# Patient Record
Sex: FEMALE | Race: WHITE | HISPANIC OR LATINO
[De-identification: names, ages, dates, MRNs, and addresses within clinical notes are randomized per-mention and may not be internally consistent; named-entity substitution may affect disease eponyms.]

---

## 2017-04-03 ENCOUNTER — APPOINTMENT (OUTPATIENT)
Dept: OBGYN | Facility: CLINIC | Age: 32
End: 2017-04-03

## 2017-09-05 ENCOUNTER — APPOINTMENT (OUTPATIENT)
Dept: OBGYN | Facility: CLINIC | Age: 32
End: 2017-09-05
Payer: MEDICAID

## 2017-09-05 VITALS
BODY MASS INDEX: 31.32 KG/M2 | WEIGHT: 188 LBS | SYSTOLIC BLOOD PRESSURE: 120 MMHG | DIASTOLIC BLOOD PRESSURE: 80 MMHG | HEIGHT: 65 IN

## 2017-09-05 DIAGNOSIS — Z83.3 FAMILY HISTORY OF DIABETES MELLITUS: ICD-10-CM

## 2017-09-05 DIAGNOSIS — Z82.49 FAMILY HISTORY OF ISCHEMIC HEART DISEASE AND OTHER DISEASES OF THE CIRCULATORY SYSTEM: ICD-10-CM

## 2017-09-05 PROCEDURE — 99203 OFFICE O/P NEW LOW 30 MIN: CPT

## 2017-09-20 ENCOUNTER — APPOINTMENT (OUTPATIENT)
Dept: OBGYN | Facility: CLINIC | Age: 32
End: 2017-09-20

## 2017-10-25 ENCOUNTER — APPOINTMENT (OUTPATIENT)
Dept: OBGYN | Facility: CLINIC | Age: 32
End: 2017-10-25
Payer: MEDICAID

## 2017-10-25 PROCEDURE — 51784 ANAL/URINARY MUSCLE STUDY: CPT

## 2017-10-25 PROCEDURE — 51741 ELECTRO-UROFLOWMETRY FIRST: CPT

## 2017-10-25 PROCEDURE — 51729 CYSTOMETROGRAM W/VP&UP: CPT

## 2017-11-09 ENCOUNTER — APPOINTMENT (OUTPATIENT)
Dept: OBGYN | Facility: CLINIC | Age: 32
End: 2017-11-09

## 2017-11-09 VITALS
SYSTOLIC BLOOD PRESSURE: 120 MMHG | BODY MASS INDEX: 30.82 KG/M2 | HEIGHT: 65 IN | WEIGHT: 185 LBS | DIASTOLIC BLOOD PRESSURE: 80 MMHG

## 2017-11-20 ENCOUNTER — APPOINTMENT (OUTPATIENT)
Dept: OBGYN | Facility: CLINIC | Age: 32
End: 2017-11-20
Payer: MEDICAID

## 2017-11-20 VITALS
SYSTOLIC BLOOD PRESSURE: 120 MMHG | WEIGHT: 185 LBS | BODY MASS INDEX: 30.82 KG/M2 | HEIGHT: 65 IN | DIASTOLIC BLOOD PRESSURE: 80 MMHG

## 2017-11-20 PROCEDURE — 99213 OFFICE O/P EST LOW 20 MIN: CPT

## 2017-12-27 ENCOUNTER — APPOINTMENT (OUTPATIENT)
Dept: OBGYN | Facility: CLINIC | Age: 32
End: 2017-12-27
Payer: MEDICAID

## 2017-12-27 VITALS
BODY MASS INDEX: 29.66 KG/M2 | SYSTOLIC BLOOD PRESSURE: 120 MMHG | WEIGHT: 178 LBS | DIASTOLIC BLOOD PRESSURE: 80 MMHG | HEIGHT: 65 IN

## 2017-12-27 DIAGNOSIS — N76.0 ACUTE VAGINITIS: ICD-10-CM

## 2017-12-27 PROCEDURE — 99213 OFFICE O/P EST LOW 20 MIN: CPT

## 2018-04-10 ENCOUNTER — APPOINTMENT (OUTPATIENT)
Dept: OBGYN | Facility: CLINIC | Age: 33
End: 2018-04-10
Payer: MEDICAID

## 2018-04-10 VITALS — DIASTOLIC BLOOD PRESSURE: 70 MMHG | SYSTOLIC BLOOD PRESSURE: 125 MMHG | BODY MASS INDEX: 29.62 KG/M2 | WEIGHT: 178 LBS

## 2018-04-10 PROCEDURE — 99213 OFFICE O/P EST LOW 20 MIN: CPT

## 2018-05-16 ENCOUNTER — APPOINTMENT (OUTPATIENT)
Dept: ENDOCRINOLOGY | Facility: CLINIC | Age: 33
End: 2018-05-16

## 2018-06-07 ENCOUNTER — EMERGENCY (EMERGENCY)
Facility: HOSPITAL | Age: 33
LOS: 1 days | Discharge: HOME | End: 2018-06-07
Attending: EMERGENCY MEDICINE
Payer: SELF-PAY

## 2018-06-07 VITALS — RESPIRATION RATE: 19 BRPM | HEART RATE: 98 BPM | SYSTOLIC BLOOD PRESSURE: 135 MMHG | DIASTOLIC BLOOD PRESSURE: 78 MMHG

## 2018-06-07 VITALS
RESPIRATION RATE: 18 BRPM | DIASTOLIC BLOOD PRESSURE: 72 MMHG | SYSTOLIC BLOOD PRESSURE: 119 MMHG | HEART RATE: 91 BPM | OXYGEN SATURATION: 99 %

## 2018-06-07 DIAGNOSIS — F41.0 PANIC DISORDER [EPISODIC PAROXYSMAL ANXIETY]: ICD-10-CM

## 2018-06-07 LAB
ALBUMIN SERPL ELPH-MCNC: 4.7 G/DL — SIGNIFICANT CHANGE UP (ref 3.3–5)
ALP SERPL-CCNC: 81 U/L — SIGNIFICANT CHANGE UP (ref 40–120)
ALT FLD-CCNC: 11 U/L — SIGNIFICANT CHANGE UP (ref 10–45)
ANION GAP SERPL CALC-SCNC: 13 MMOL/L — SIGNIFICANT CHANGE UP (ref 5–17)
APAP SERPL-MCNC: <15 UG/ML — SIGNIFICANT CHANGE UP (ref 10–30)
AST SERPL-CCNC: 11 U/L — SIGNIFICANT CHANGE UP (ref 10–40)
BASOPHILS # BLD AUTO: 0 K/UL — SIGNIFICANT CHANGE UP (ref 0–0.2)
BASOPHILS NFR BLD AUTO: 0.2 % — SIGNIFICANT CHANGE UP (ref 0–2)
BILIRUB SERPL-MCNC: 0.3 MG/DL — SIGNIFICANT CHANGE UP (ref 0.2–1.2)
BUN SERPL-MCNC: 8 MG/DL — SIGNIFICANT CHANGE UP (ref 7–23)
CALCIUM SERPL-MCNC: 9.7 MG/DL — SIGNIFICANT CHANGE UP (ref 8.4–10.5)
CHLORIDE SERPL-SCNC: 100 MMOL/L — SIGNIFICANT CHANGE UP (ref 96–108)
CO2 SERPL-SCNC: 24 MMOL/L — SIGNIFICANT CHANGE UP (ref 22–31)
CREAT SERPL-MCNC: 0.69 MG/DL — SIGNIFICANT CHANGE UP (ref 0.5–1.3)
EOSINOPHIL # BLD AUTO: 0 K/UL — SIGNIFICANT CHANGE UP (ref 0–0.5)
EOSINOPHIL NFR BLD AUTO: 0.4 % — SIGNIFICANT CHANGE UP (ref 0–6)
ETHANOL SERPL-MCNC: SIGNIFICANT CHANGE UP MG/DL (ref 0–10)
GLUCOSE SERPL-MCNC: 99 MG/DL — SIGNIFICANT CHANGE UP (ref 70–99)
HCG SERPL-ACNC: <2 MIU/ML — LOW (ref 5–24)
HCT VFR BLD CALC: 37.3 % — SIGNIFICANT CHANGE UP (ref 34.5–45)
HGB BLD-MCNC: 12.4 G/DL — SIGNIFICANT CHANGE UP (ref 11.5–15.5)
LYMPHOCYTES # BLD AUTO: 1.8 K/UL — SIGNIFICANT CHANGE UP (ref 1–3.3)
LYMPHOCYTES # BLD AUTO: 16 % — SIGNIFICANT CHANGE UP (ref 13–44)
MCHC RBC-ENTMCNC: 26 PG — LOW (ref 27–34)
MCHC RBC-ENTMCNC: 33.1 GM/DL — SIGNIFICANT CHANGE UP (ref 32–36)
MCV RBC AUTO: 78.5 FL — LOW (ref 80–100)
MONOCYTES # BLD AUTO: 0.6 K/UL — SIGNIFICANT CHANGE UP (ref 0–0.9)
MONOCYTES NFR BLD AUTO: 5.7 % — SIGNIFICANT CHANGE UP (ref 2–14)
NEUTROPHILS # BLD AUTO: 8.5 K/UL — HIGH (ref 1.8–7.4)
NEUTROPHILS NFR BLD AUTO: 77.7 % — HIGH (ref 43–77)
PLATELET # BLD AUTO: 387 K/UL — SIGNIFICANT CHANGE UP (ref 150–400)
POTASSIUM SERPL-MCNC: 3.7 MMOL/L — SIGNIFICANT CHANGE UP (ref 3.5–5.3)
POTASSIUM SERPL-SCNC: 3.7 MMOL/L — SIGNIFICANT CHANGE UP (ref 3.5–5.3)
PROT SERPL-MCNC: 8.9 G/DL — HIGH (ref 6–8.3)
RBC # BLD: 4.75 M/UL — SIGNIFICANT CHANGE UP (ref 3.8–5.2)
RBC # FLD: 14 % — SIGNIFICANT CHANGE UP (ref 10.3–14.5)
SALICYLATES SERPL-MCNC: <2 MG/DL — LOW (ref 15–30)
SODIUM SERPL-SCNC: 137 MMOL/L — SIGNIFICANT CHANGE UP (ref 135–145)
TROPONIN T SERPL-MCNC: <0.01 NG/ML — SIGNIFICANT CHANGE UP (ref 0–0.06)
WBC # BLD: 10.9 K/UL — HIGH (ref 3.8–10.5)
WBC # FLD AUTO: 10.9 K/UL — HIGH (ref 3.8–10.5)

## 2018-06-07 PROCEDURE — 84702 CHORIONIC GONADOTROPIN TEST: CPT

## 2018-06-07 PROCEDURE — 84484 ASSAY OF TROPONIN QUANT: CPT

## 2018-06-07 PROCEDURE — 80053 COMPREHEN METABOLIC PANEL: CPT

## 2018-06-07 PROCEDURE — 90792 PSYCH DIAG EVAL W/MED SRVCS: CPT

## 2018-06-07 PROCEDURE — 99284 EMERGENCY DEPT VISIT MOD MDM: CPT | Mod: 25

## 2018-06-07 PROCEDURE — 93005 ELECTROCARDIOGRAM TRACING: CPT

## 2018-06-07 PROCEDURE — 85027 COMPLETE CBC AUTOMATED: CPT

## 2018-06-07 PROCEDURE — 99285 EMERGENCY DEPT VISIT HI MDM: CPT

## 2018-06-07 PROCEDURE — 93010 ELECTROCARDIOGRAM REPORT: CPT

## 2018-06-07 PROCEDURE — 80307 DRUG TEST PRSMV CHEM ANLYZR: CPT

## 2018-06-07 RX ADMIN — Medication 1 MILLIGRAM(S): at 10:57
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
## 2018-06-07 NOTE — ED PROVIDER NOTE - CARE PLAN
Principal Discharge DX:	Syncope, unspecified syncope type  Assessment and plan of treatment:	1) Drink plenty of fluids   2) You were given a copy of your results, please show them to your doctor for review.   3) Please follow up with your primary medical doctor in 1-2 days for reevaluation. If you do not have pmd please call the general medicine clinic for an appointment at [phone].   4) Also, follow up with cardiology in 2-3 days for rapid follow up, call [phone] for appointment and give form at your appointment   5) Also, follow up with your psychiatrist in 2-3 days, if you cannot make an appointment follow up at clinic at [phone] for an appointment   6) Return to the ED for feeling faint again, any pain, chest pain, shortness of breath, dizziness, lightheadedness, pass out again, fever greater than 100.4, nausea, vomiting, depression, anxiety, thoughts of suicide, thoughts of harming others, or if you have any other new, worsening, or concerning symptoms.  Secondary Diagnosis:	Panic attack Principal Discharge DX:	Syncope, unspecified syncope type  Assessment and plan of treatment:	1) Drink plenty of fluids   2) You were given a copy of your results, please show them to your doctor for review.   3) Please follow up with your primary medical doctor in 1-2 days for reevaluation. If you do not have pmd please call the general medicine clinic for an appointment at [phone].   4) Also, follow up with cardiology in 2-3 days for rapid follow up, call [phone] for appointment and give form at your appointment   5) Also, follow up with your psychiatrist in 2-3 days, if you cannot make an appointment follow up at clinic at [phone] for an appointment.   6) Return to the ED for feeling faint again, any pain, chest pain, shortness of breath, dizziness, lightheadedness, pass out again, fever greater than 100.4, nausea, vomiting, depression, anxiety, thoughts of suicide, thoughts of harming others, or if you have any other new, worsening, or concerning symptoms.  Secondary Diagnosis:	Panic attack
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
## 2018-06-07 NOTE — ED BEHAVIORAL HEALTH ASSESSMENT NOTE - OTHER PAST PSYCHIATRIC HISTORY (INCLUDE DETAILS REGARDING ONSET, COURSE OF ILLNESS, INPATIENT/OUTPATIENT TREATMENT)
Sees a therapist at [facility] [place], reports seeing a psychiatrist once at [facility] last month- was not started on medications

## 2018-06-07 NOTE — ED PROVIDER NOTE - ATTENDING CONTRIBUTION TO CARE
attending Forrest: 32yF presents after episode of anxiety. Assoc with substernal chest pain, flushing, palpitations, shortness of breath. Also with episode of syncope. No trauma. Asymptomatic in ED. Non-smoker. No drug use. No FH of early cardiac disease. Has seen a psychiatrist outpatient but not on any meds. Likely panic attack but will eval syncope and episode of chest pain. HEART score 0. Denies SI or HI. Will obtain labs, ekg, place on tele, psych eval and reassess.

## 2018-06-07 NOTE — ED PROVIDER NOTE - PROGRESS NOTE DETAILS
Patient given ativan 1mg, patient now calm, she feels her normal self, no anxiety. MD Luís: Seen by psych, cleared by them, patient to f/u with outpatient psychiatrist, no need to start medication at this time. MD Luís: patient feels improved, no chest pain, shortness of breath, lightheadedness, dizziness, nausea, vomiting. Asymptomatic, will d/c home with rapid cardiology follow up form given to patient, psych follow up given as well in case patient cannot see own psychiatrist, reviewed labwork, strict return precautions given.
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
## 2018-06-07 NOTE — ED PROVIDER NOTE - MEDICAL DECISION MAKING DETAILS
32 year old female, presents to the ED for anxiety. Patient was at work when she reports that she had a sensation that something was wrong with her kids. Then had syncope preceded by chest pain, shortness of breath, palpitations, feeling flush. Likely panic attack but will need to eval for syncope and ACS, low risk given heart score of 0, so will delta trop and have patient follow up with cardiology. For concern for her panic attacks will obtain psych clearance labs and psych consult.

## 2018-06-07 NOTE — ED BEHAVIORAL HEALTH ASSESSMENT NOTE - SAFETY PLAN DETAILS
Patient sees outpatient provider at [facility] in [place] on a weekly basis
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
## 2018-06-07 NOTE — ED ADULT NURSE NOTE - CHPI ED SYMPTOMS NEG
no change in level of consciousness/no confusion/no disorientation/no agitation/no suicidal/no homicidal/no paranoia/no hallucinations

## 2018-06-07 NOTE — ED BEHAVIORAL HEALTH ASSESSMENT NOTE - DESCRIPTION
employed as a  at [employer], has 3 dependents in the custody of mother (APS involved), no substance abuse, domiciled in an apartment in [place] None Patient anxious, received Ativan 1mg po x 1 dose, with good effect, calm, sedated however arousable following taking medications.
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
## 2018-06-07 NOTE — ED PROVIDER NOTE - PLAN OF CARE
1) Drink plenty of fluids   2) You were given a copy of your results, please show them to your doctor for review.   3) Please follow up with your primary medical doctor in 1-2 days for reevaluation. If you do not have pmd please call the general medicine clinic for an appointment at [phone].   4) Also, follow up with cardiology in 2-3 days for rapid follow up, call [phone] for appointment and give form at your appointment   5) Also, follow up with your psychiatrist in 2-3 days, if you cannot make an appointment follow up at clinic at [phone] for an appointment   6) Return to the ED for feeling faint again, any pain, chest pain, shortness of breath, dizziness, lightheadedness, pass out again, fever greater than 100.4, nausea, vomiting, depression, anxiety, thoughts of suicide, thoughts of harming others, or if you have any other new, worsening, or concerning symptoms. 1) Drink plenty of fluids   2) You were given a copy of your results, please show them to your doctor for review.   3) Please follow up with your primary medical doctor in 1-2 days for reevaluation. If you do not have pmd please call the general medicine clinic for an appointment at [phone].   4) Also, follow up with cardiology in 2-3 days for rapid follow up, call [phone] for appointment and give form at your appointment   5) Also, follow up with your psychiatrist in 2-3 days, if you cannot make an appointment follow up at clinic at [phone] for an appointment.   6) Return to the ED for feeling faint again, any pain, chest pain, shortness of breath, dizziness, lightheadedness, pass out again, fever greater than 100.4, nausea, vomiting, depression, anxiety, thoughts of suicide, thoughts of harming others, or if you have any other new, worsening, or concerning symptoms.

## 2018-06-07 NOTE — ED PROVIDER NOTE - OBJECTIVE STATEMENT
32 year old female, presents to the ED for anxiety. Patient was at work when she reports that she had a sensation that something was wrong with her kids. Her kids were reportedly taken away by child protective services. She left a meeting when she got a phone call from her mom, but the call got interrupted so the patient reports that she developed anxiety, substernal chest pain, flushing, palpitations, shortness of breath. When patient got back to the meeting, patient slumped over and passed out in the chair. She did not fall. No head or neck trauma. Witnessed by coworker who is in the ED. She reports no chest pain, shortness of breath, nausea, vomiting, dizziness, lightheadedness now. Non-smoker. No drug use. No FH of early cardiac disease. Has seen a psychiatrist for ACS eval, was told she had mild depression but was not started on any meds. Patient reports no SI or HI. No access to weapons.

## 2018-06-07 NOTE — ED BEHAVIORAL HEALTH ASSESSMENT NOTE - CASE SUMMARY
This is a 32-year-old HF pt, presents to the ED for overwhelming anxiety and SOB. Patient has no recent psychiatric history, started seeing a Therapist at [facility] on a weekly basis and started to see a psychiatrist. No history of substance abuse, SA, or alcohol use.  Patient was at work when she reports that she had a sensation that something was wrong, she developed anxiety, substernal chest pain, flushing, palpitations, shortness of breath. She then slumped over and passed out in the chair. This is her first episode with such symptoms.     She denies S/H/I/I/P, A/V/H, or perceptual disturbances. Patient reports worry that similar episode will occur again and encouraged to follow up with her outpatient provider.  Patient follows with a therapist and psychiatrist at [facility] in [place].  Patient may also follow with [facility] [phone].    I have seen and evaluated this patient myself. Chart, labs, meds reviewed. I agree with NP's assessment and plan.
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
## 2018-07-26 ENCOUNTER — APPOINTMENT (OUTPATIENT)
Dept: OBGYN | Facility: CLINIC | Age: 33
End: 2018-07-26
Payer: MEDICAID

## 2018-07-26 ENCOUNTER — ASOB RESULT (OUTPATIENT)
Age: 33
End: 2018-07-26

## 2018-07-26 PROCEDURE — 76856 US EXAM PELVIC COMPLETE: CPT

## 2018-08-11 ENCOUNTER — EMERGENCY (EMERGENCY)
Facility: HOSPITAL | Age: 33
LOS: 1 days | Discharge: HOME | End: 2018-08-11
Attending: EMERGENCY MEDICINE
Payer: COMMERCIAL

## 2018-08-11 VITALS
OXYGEN SATURATION: 98 % | RESPIRATION RATE: 18 BRPM | SYSTOLIC BLOOD PRESSURE: 114 MMHG | HEART RATE: 89 BPM | TEMPERATURE: 99 F | DIASTOLIC BLOOD PRESSURE: 76 MMHG | HEIGHT: 65 IN | WEIGHT: 179.68 LBS

## 2018-08-11 PROCEDURE — 99283 EMERGENCY DEPT VISIT LOW MDM: CPT

## 2018-08-11 PROCEDURE — 96372 THER/PROPH/DIAG INJ SC/IM: CPT

## 2018-08-11 PROCEDURE — 99283 EMERGENCY DEPT VISIT LOW MDM: CPT | Mod: 25

## 2018-08-11 RX ORDER — KETOROLAC TROMETHAMINE 30 MG/ML
30 SYRINGE (ML) INJECTION ONCE
Qty: 0 | Refills: 0 | Status: DISCONTINUED | OUTPATIENT
Start: 2018-08-11 | End: 2018-08-11

## 2018-08-11 RX ORDER — LIDOCAINE 4 G/100G
1 CREAM TOPICAL ONCE
Qty: 0 | Refills: 0 | Status: COMPLETED | OUTPATIENT
Start: 2018-08-11 | End: 2018-08-11

## 2018-08-11 RX ORDER — LIDOCAINE 4 G/100G
1 CREAM TOPICAL ONCE
Qty: 0 | Refills: 0 | Status: DISCONTINUED | OUTPATIENT
Start: 2018-08-11 | End: 2018-08-11

## 2018-08-11 RX ORDER — DIPHENHYDRAMINE HCL 50 MG
25 CAPSULE ORAL ONCE
Qty: 0 | Refills: 0 | Status: COMPLETED | OUTPATIENT
Start: 2018-08-11 | End: 2018-08-11

## 2018-08-11 RX ORDER — METOCLOPRAMIDE HCL 10 MG
10 TABLET ORAL ONCE
Qty: 0 | Refills: 0 | Status: COMPLETED | OUTPATIENT
Start: 2018-08-11 | End: 2018-08-11

## 2018-08-11 RX ADMIN — Medication 10 MILLIGRAM(S): at 23:25

## 2018-08-11 RX ADMIN — Medication 30 MILLIGRAM(S): at 23:25

## 2018-08-11 RX ADMIN — LIDOCAINE 1 APPLICATION(S): 4 CREAM TOPICAL at 23:30

## 2018-08-11 RX ADMIN — Medication 25 MILLIGRAM(S): at 23:25

## 2018-08-11 NOTE — ED PROVIDER NOTE - MEDICAL DECISION MAKING DETAILS
HA resolved with toradol and reglan. tooth pain improved with lidocaine cream. advised to f/u with PMD and dental. return precautions given. likely tension HA.

## 2018-08-11 NOTE — ED PROVIDER NOTE - OBJECTIVE STATEMENT
32 year old female denies PMH coming in with one day of generalized HA and right front tooth pain for the past 3 days. hasn't taken any medication for the HA. denies vision changes, numbness, tingling, weakness, dizziness, cp, sob, palpitations, abd pains, N/v/d/C, urinary complaints,f karen, chills, sweats.

## 2018-08-11 NOTE — ED PROVIDER NOTE - CARE PLAN
Principal Discharge DX:	Acute non intractable tension-type headache  Secondary Diagnosis:	Toothache Principal Discharge DX:	Acute non intractable tension-type headache  Assessment and plan of treatment:	32 year old female with ha and tooth pain. vitals WNL. PE as above.  pain meds, lidocaine cream  Secondary Diagnosis:	Toothache

## 2018-09-18 ENCOUNTER — APPOINTMENT (OUTPATIENT)
Dept: ENDOCRINOLOGY | Facility: CLINIC | Age: 33
End: 2018-09-18
Payer: MEDICAID

## 2018-09-18 ENCOUNTER — APPOINTMENT (OUTPATIENT)
Dept: OBGYN | Facility: CLINIC | Age: 33
End: 2018-09-18
Payer: MEDICAID

## 2018-09-18 ENCOUNTER — LABORATORY RESULT (OUTPATIENT)
Age: 33
End: 2018-09-18

## 2018-09-18 VITALS
HEIGHT: 65 IN | OXYGEN SATURATION: 100 % | DIASTOLIC BLOOD PRESSURE: 76 MMHG | SYSTOLIC BLOOD PRESSURE: 117 MMHG | BODY MASS INDEX: 30.66 KG/M2 | RESPIRATION RATE: 16 BRPM | TEMPERATURE: 98.5 F | WEIGHT: 184 LBS | HEART RATE: 82 BPM

## 2018-09-18 VITALS
DIASTOLIC BLOOD PRESSURE: 80 MMHG | HEIGHT: 65 IN | BODY MASS INDEX: 30.32 KG/M2 | SYSTOLIC BLOOD PRESSURE: 110 MMHG | WEIGHT: 182 LBS

## 2018-09-18 DIAGNOSIS — Z82.49 FAMILY HISTORY OF ISCHEMIC HEART DISEASE AND OTHER DISEASES OF THE CIRCULATORY SYSTEM: ICD-10-CM

## 2018-09-18 DIAGNOSIS — Z83.42 FAMILY HISTORY OF FAMILIAL HYPERCHOLESTEROLEMIA: ICD-10-CM

## 2018-09-18 PROCEDURE — 99213 OFFICE O/P EST LOW 20 MIN: CPT

## 2018-09-18 PROCEDURE — 99204 OFFICE O/P NEW MOD 45 MIN: CPT

## 2018-09-18 RX ORDER — METRONIDAZOLE 7.5 MG/G
0.75 GEL VAGINAL
Qty: 1 | Refills: 2 | Status: COMPLETED | COMMUNITY
Start: 2017-11-20 | End: 2018-09-18

## 2018-09-18 RX ORDER — NORETHINDRONE ACETATE AND ETHINYL ESTRADIOL 1; 20 MG/1; UG/1
1-20 TABLET ORAL
Qty: 1 | Refills: 3 | Status: COMPLETED | COMMUNITY
Start: 2017-12-27 | End: 2018-09-18

## 2018-09-18 RX ORDER — CIPROFLOXACIN HYDROCHLORIDE 500 MG/1
500 TABLET, FILM COATED ORAL
Qty: 14 | Refills: 0 | Status: COMPLETED | COMMUNITY
Start: 2017-09-12 | End: 2018-09-18

## 2018-09-18 RX ORDER — TERCONAZOLE 8 MG/G
0.8 CREAM VAGINAL
Qty: 1 | Refills: 4 | Status: COMPLETED | COMMUNITY
Start: 2017-12-27 | End: 2018-09-18

## 2018-09-18 RX ORDER — METRONIDAZOLE 500 MG/1
500 TABLET ORAL
Qty: 14 | Refills: 2 | Status: COMPLETED | COMMUNITY
Start: 2018-04-10 | End: 2018-09-18

## 2018-09-18 RX ORDER — CLOTRIMAZOLE AND BETAMETHASONE DIPROPIONATE 10; .5 MG/G; MG/G
1-0.05 CREAM TOPICAL TWICE DAILY
Qty: 1 | Refills: 0 | Status: ACTIVE | COMMUNITY
Start: 2018-09-18 | End: 1900-01-01

## 2018-09-19 LAB
C PEPTIDE SERPL-MCNC: 1.2 NG/ML
DHEA-S SERPL-MCNC: 471 UG/DL
ESTRADIOL SERPL-MCNC: 102 PG/ML
FSH SERPL-MCNC: 3.5 IU/L
GLUCOSE BS SERPL-MCNC: 77 MG/DL
HBA1C MFR BLD HPLC: 5.5 %
LH SERPL-ACNC: 10.1 IU/L
PROLACTIN SERPL-MCNC: 16.5 NG/ML
T4 FREE SERPL-MCNC: 0.9 NG/DL
TSH SERPL-ACNC: 1.81 UIU/ML

## 2018-09-22 LAB
17OHP SERPL-MCNC: 124 NG/DL
TESTOST BND SERPL-MCNC: 2.6 PG/ML
TESTOST SERPL-MCNC: 42.8 NG/DL

## 2018-10-18 ENCOUNTER — APPOINTMENT (OUTPATIENT)
Dept: ENDOCRINOLOGY | Facility: CLINIC | Age: 33
End: 2018-10-18
Payer: MEDICAID

## 2018-10-18 VITALS
WEIGHT: 182 LBS | OXYGEN SATURATION: 100 % | SYSTOLIC BLOOD PRESSURE: 137 MMHG | RESPIRATION RATE: 16 BRPM | HEIGHT: 65 IN | HEART RATE: 77 BPM | TEMPERATURE: 98.4 F | DIASTOLIC BLOOD PRESSURE: 81 MMHG | BODY MASS INDEX: 30.32 KG/M2

## 2018-10-18 PROCEDURE — 99214 OFFICE O/P EST MOD 30 MIN: CPT

## 2019-01-06 ENCOUNTER — EMERGENCY (EMERGENCY)
Facility: HOSPITAL | Age: 34
LOS: 1 days | Discharge: HOME | End: 2019-01-06
Attending: EMERGENCY MEDICINE
Payer: COMMERCIAL

## 2019-01-06 VITALS
WEIGHT: 179.9 LBS | RESPIRATION RATE: 16 BRPM | TEMPERATURE: 98 F | HEIGHT: 65 IN | HEART RATE: 82 BPM | OXYGEN SATURATION: 99 % | DIASTOLIC BLOOD PRESSURE: 85 MMHG | SYSTOLIC BLOOD PRESSURE: 139 MMHG

## 2019-01-06 PROCEDURE — 99283 EMERGENCY DEPT VISIT LOW MDM: CPT

## 2019-01-06 RX ORDER — IBUPROFEN 200 MG
600 TABLET ORAL ONCE
Qty: 0 | Refills: 0 | Status: COMPLETED | OUTPATIENT
Start: 2019-01-06 | End: 2019-01-06

## 2019-01-06 RX ORDER — DIAZEPAM 5 MG
5 TABLET ORAL ONCE
Qty: 0 | Refills: 0 | Status: DISCONTINUED | OUTPATIENT
Start: 2019-01-06 | End: 2019-01-06

## 2019-01-06 NOTE — ED ADULT NURSE NOTE - PAIN RATING/NUMBER SCALE (0-10): REST
Problem: Goal Outcome Summary  Goal: Goal Outcome Summary  Outcome: No Change  Alert and oriented x4. Makes needs known. Asleep for majority of the night. Medicated with Baclofen prn this morning at 0535 for complaints of LLE spasms. Incontinent of bladder, no BM. Hinged brace to LLA in place, ace wraps c/d/i. Call light in reach. Continue with POC.        6

## 2019-01-06 NOTE — ED ADULT NURSE NOTE - OBJECTIVE STATEMENT
33 years old female presents to ED c/o persistent neck pains for a month. Worsening overtime. P/S 6/10. Patient denies any fever, headache any photophobia.

## 2019-01-07 LAB — HCG UR QL: NEGATIVE — SIGNIFICANT CHANGE UP

## 2019-01-07 PROCEDURE — 72050 X-RAY EXAM NECK SPINE 4/5VWS: CPT

## 2019-01-07 PROCEDURE — 81025 URINE PREGNANCY TEST: CPT

## 2019-01-07 PROCEDURE — 72050 X-RAY EXAM NECK SPINE 4/5VWS: CPT | Mod: 26

## 2019-01-07 PROCEDURE — 99283 EMERGENCY DEPT VISIT LOW MDM: CPT | Mod: 25

## 2019-01-07 RX ORDER — IBUPROFEN 200 MG
1 TABLET ORAL
Qty: 30 | Refills: 0
Start: 2019-01-07 | End: 2019-01-16

## 2019-01-07 RX ORDER — DIAZEPAM 5 MG
1 TABLET ORAL
Qty: 9 | Refills: 0
Start: 2019-01-07 | End: 2019-01-09

## 2019-01-07 RX ADMIN — Medication 5 MILLIGRAM(S): at 01:42

## 2019-01-07 RX ADMIN — Medication 600 MILLIGRAM(S): at 01:42

## 2019-01-07 NOTE — ED PROVIDER NOTE - OBJECTIVE STATEMENT
33 y.o. female LMP 12/7/18, pt claims she woke up ~1mo ago with neck pain, progressively radiated to Rt shoulder, pt took Naprosyn x1 with relief, pain with neck movement, stiffness, no fever, numbness 33 y.o. female LMP 12/7/18, pt claims she woke up ~1mo ago with neck pain, progressively radiated to Rt shoulder, pt took Naprosyn x1 with relief, pain with neck movement, stiffness, no fever, numbness.  Pt claims she is under a lot of stress.

## 2019-01-24 ENCOUNTER — APPOINTMENT (OUTPATIENT)
Dept: ENDOCRINOLOGY | Facility: CLINIC | Age: 34
End: 2019-01-24

## 2019-05-23 ENCOUNTER — APPOINTMENT (OUTPATIENT)
Dept: OBGYN | Facility: CLINIC | Age: 34
End: 2019-05-23
Payer: MEDICAID

## 2019-05-23 VITALS — SYSTOLIC BLOOD PRESSURE: 120 MMHG | BODY MASS INDEX: 30.45 KG/M2 | WEIGHT: 183 LBS | DIASTOLIC BLOOD PRESSURE: 80 MMHG

## 2019-05-23 PROCEDURE — 99395 PREV VISIT EST AGE 18-39: CPT

## 2019-07-08 ENCOUNTER — EMERGENCY (EMERGENCY)
Facility: HOSPITAL | Age: 34
LOS: 1 days | Discharge: HOME | End: 2019-07-08
Attending: EMERGENCY MEDICINE
Payer: COMMERCIAL

## 2019-07-08 VITALS
HEIGHT: 65 IN | TEMPERATURE: 99 F | HEART RATE: 89 BPM | DIASTOLIC BLOOD PRESSURE: 86 MMHG | SYSTOLIC BLOOD PRESSURE: 131 MMHG | RESPIRATION RATE: 18 BRPM | OXYGEN SATURATION: 98 % | WEIGHT: 190.04 LBS

## 2019-07-08 VITALS
SYSTOLIC BLOOD PRESSURE: 127 MMHG | DIASTOLIC BLOOD PRESSURE: 80 MMHG | HEART RATE: 87 BPM | RESPIRATION RATE: 18 BRPM | OXYGEN SATURATION: 99 %

## 2019-07-08 DIAGNOSIS — Z98.890 OTHER SPECIFIED POSTPROCEDURAL STATES: Chronic | ICD-10-CM

## 2019-07-08 LAB
ALBUMIN SERPL ELPH-MCNC: 3.7 G/DL — SIGNIFICANT CHANGE UP (ref 3.5–5)
ALP SERPL-CCNC: 117 U/L — SIGNIFICANT CHANGE UP (ref 40–120)
ALT FLD-CCNC: 36 U/L DA — SIGNIFICANT CHANGE UP (ref 10–60)
ANION GAP SERPL CALC-SCNC: 5 MMOL/L — SIGNIFICANT CHANGE UP (ref 5–17)
APPEARANCE UR: CLEAR — SIGNIFICANT CHANGE UP
AST SERPL-CCNC: 20 U/L — SIGNIFICANT CHANGE UP (ref 10–40)
BASOPHILS # BLD AUTO: 0.02 K/UL — SIGNIFICANT CHANGE UP (ref 0–0.2)
BASOPHILS NFR BLD AUTO: 0.2 % — SIGNIFICANT CHANGE UP (ref 0–2)
BILIRUB SERPL-MCNC: 0.2 MG/DL — SIGNIFICANT CHANGE UP (ref 0.2–1.2)
BILIRUB UR-MCNC: NEGATIVE — SIGNIFICANT CHANGE UP
BUN SERPL-MCNC: 13 MG/DL — SIGNIFICANT CHANGE UP (ref 7–18)
CALCIUM SERPL-MCNC: 8.6 MG/DL — SIGNIFICANT CHANGE UP (ref 8.4–10.5)
CHLORIDE SERPL-SCNC: 106 MMOL/L — SIGNIFICANT CHANGE UP (ref 96–108)
CO2 SERPL-SCNC: 27 MMOL/L — SIGNIFICANT CHANGE UP (ref 22–31)
COLOR SPEC: YELLOW — SIGNIFICANT CHANGE UP
CREAT SERPL-MCNC: 0.69 MG/DL — SIGNIFICANT CHANGE UP (ref 0.5–1.3)
DIFF PNL FLD: ABNORMAL
EOSINOPHIL # BLD AUTO: 0.12 K/UL — SIGNIFICANT CHANGE UP (ref 0–0.5)
EOSINOPHIL NFR BLD AUTO: 1.5 % — SIGNIFICANT CHANGE UP (ref 0–6)
EPI CELLS # UR: SIGNIFICANT CHANGE UP /HPF
GLUCOSE SERPL-MCNC: 80 MG/DL — SIGNIFICANT CHANGE UP (ref 70–99)
GLUCOSE UR QL: NEGATIVE — SIGNIFICANT CHANGE UP
HCG SERPL-ACNC: <1 MIU/ML — SIGNIFICANT CHANGE UP
HCT VFR BLD CALC: 33.8 % — LOW (ref 34.5–45)
HGB BLD-MCNC: 10.8 G/DL — LOW (ref 11.5–15.5)
IMM GRANULOCYTES NFR BLD AUTO: 0.2 % — SIGNIFICANT CHANGE UP (ref 0–1.5)
KETONES UR-MCNC: NEGATIVE — SIGNIFICANT CHANGE UP
LEUKOCYTE ESTERASE UR-ACNC: NEGATIVE — SIGNIFICANT CHANGE UP
LIDOCAIN IGE QN: 238 U/L — SIGNIFICANT CHANGE UP (ref 73–393)
LYMPHOCYTES # BLD AUTO: 1.58 K/UL — SIGNIFICANT CHANGE UP (ref 1–3.3)
LYMPHOCYTES # BLD AUTO: 19.3 % — SIGNIFICANT CHANGE UP (ref 13–44)
MCHC RBC-ENTMCNC: 26.1 PG — LOW (ref 27–34)
MCHC RBC-ENTMCNC: 32 GM/DL — SIGNIFICANT CHANGE UP (ref 32–36)
MCV RBC AUTO: 81.6 FL — SIGNIFICANT CHANGE UP (ref 80–100)
MONOCYTES # BLD AUTO: 0.6 K/UL — SIGNIFICANT CHANGE UP (ref 0–0.9)
MONOCYTES NFR BLD AUTO: 7.3 % — SIGNIFICANT CHANGE UP (ref 2–14)
NEUTROPHILS # BLD AUTO: 5.86 K/UL — SIGNIFICANT CHANGE UP (ref 1.8–7.4)
NEUTROPHILS NFR BLD AUTO: 71.5 % — SIGNIFICANT CHANGE UP (ref 43–77)
NITRITE UR-MCNC: NEGATIVE — SIGNIFICANT CHANGE UP
NRBC # BLD: 0 /100 WBCS — SIGNIFICANT CHANGE UP (ref 0–0)
PH UR: 6 — SIGNIFICANT CHANGE UP (ref 5–8)
PLATELET # BLD AUTO: 347 K/UL — SIGNIFICANT CHANGE UP (ref 150–400)
POTASSIUM SERPL-MCNC: 3.8 MMOL/L — SIGNIFICANT CHANGE UP (ref 3.5–5.3)
POTASSIUM SERPL-SCNC: 3.8 MMOL/L — SIGNIFICANT CHANGE UP (ref 3.5–5.3)
PROT SERPL-MCNC: 7.8 G/DL — SIGNIFICANT CHANGE UP (ref 6–8.3)
PROT UR-MCNC: NEGATIVE — SIGNIFICANT CHANGE UP
RBC # BLD: 4.14 M/UL — SIGNIFICANT CHANGE UP (ref 3.8–5.2)
RBC # FLD: 14.6 % — HIGH (ref 10.3–14.5)
RBC CASTS # UR COMP ASSIST: SIGNIFICANT CHANGE UP /HPF (ref 0–2)
SODIUM SERPL-SCNC: 138 MMOL/L — SIGNIFICANT CHANGE UP (ref 135–145)
SP GR SPEC: 1.01 — SIGNIFICANT CHANGE UP (ref 1.01–1.02)
UROBILINOGEN FLD QL: NEGATIVE — SIGNIFICANT CHANGE UP
WBC # BLD: 8.2 K/UL — SIGNIFICANT CHANGE UP (ref 3.8–10.5)
WBC # FLD AUTO: 8.2 K/UL — SIGNIFICANT CHANGE UP (ref 3.8–10.5)
WBC UR QL: SIGNIFICANT CHANGE UP /HPF (ref 0–5)

## 2019-07-08 PROCEDURE — 99284 EMERGENCY DEPT VISIT MOD MDM: CPT

## 2019-07-08 PROCEDURE — 99284 EMERGENCY DEPT VISIT MOD MDM: CPT | Mod: 25

## 2019-07-08 PROCEDURE — 80053 COMPREHEN METABOLIC PANEL: CPT

## 2019-07-08 PROCEDURE — 83690 ASSAY OF LIPASE: CPT

## 2019-07-08 PROCEDURE — 85027 COMPLETE CBC AUTOMATED: CPT

## 2019-07-08 PROCEDURE — 76856 US EXAM PELVIC COMPLETE: CPT | Mod: 26

## 2019-07-08 PROCEDURE — 76856 US EXAM PELVIC COMPLETE: CPT

## 2019-07-08 PROCEDURE — 74177 CT ABD & PELVIS W/CONTRAST: CPT

## 2019-07-08 PROCEDURE — 84702 CHORIONIC GONADOTROPIN TEST: CPT

## 2019-07-08 PROCEDURE — 36415 COLL VENOUS BLD VENIPUNCTURE: CPT

## 2019-07-08 PROCEDURE — 74177 CT ABD & PELVIS W/CONTRAST: CPT | Mod: 26

## 2019-07-08 PROCEDURE — 76830 TRANSVAGINAL US NON-OB: CPT | Mod: 26

## 2019-07-08 PROCEDURE — 81001 URINALYSIS AUTO W/SCOPE: CPT

## 2019-07-08 PROCEDURE — 87086 URINE CULTURE/COLONY COUNT: CPT

## 2019-07-08 PROCEDURE — 76830 TRANSVAGINAL US NON-OB: CPT

## 2019-07-08 RX ORDER — SODIUM CHLORIDE 9 MG/ML
1000 INJECTION INTRAMUSCULAR; INTRAVENOUS; SUBCUTANEOUS ONCE
Refills: 0 | Status: COMPLETED | OUTPATIENT
Start: 2019-07-08 | End: 2019-07-08

## 2019-07-08 RX ORDER — IBUPROFEN 200 MG
1 TABLET ORAL
Qty: 30 | Refills: 0
Start: 2019-07-08 | End: 2019-07-17

## 2019-07-08 RX ORDER — IOHEXOL 300 MG/ML
30 INJECTION, SOLUTION INTRAVENOUS ONCE
Refills: 0 | Status: COMPLETED | OUTPATIENT
Start: 2019-07-08 | End: 2019-07-08

## 2019-07-08 RX ADMIN — SODIUM CHLORIDE 1000 MILLILITER(S): 9 INJECTION INTRAMUSCULAR; INTRAVENOUS; SUBCUTANEOUS at 11:18

## 2019-07-08 RX ADMIN — IOHEXOL 30 MILLILITER(S): 300 INJECTION, SOLUTION INTRAVENOUS at 11:26

## 2019-07-08 NOTE — ED PROVIDER NOTE - OBJECTIVE STATEMENT
32 y/o F pt with a PMHx of kidney stone and a significant PSHx of exploratory laparotomy, presents to the ED with complaints of intermittent sharp LLQ abdominal pain x 2 days and mid low back pain. Patient notes dysuria, nausea and vaginal bleeding. Patient reports she took Tylenol 2 days ago with minimal relief. States no hx of STD. Patient denies vomiting, diarrhea, vaginal discharge or any other symptoms. NKDA.

## 2019-07-08 NOTE — ED PROVIDER NOTE - CARE PLAN
Principal Discharge DX:	Abdominal pain  Secondary Diagnosis:	Adrenal myelolipoma  Secondary Diagnosis:	Renal cyst  Secondary Diagnosis:	Uterine fibroid

## 2019-07-08 NOTE — ED PROVIDER NOTE - PROGRESS NOTE DETAILS
Pt feeling better, pt with poss. adrenal myelolipoma, advised to f/u with PMD.  Also renal cyst, renal stones.  Also advised to f/u with gyn regarding fibroid

## 2019-07-08 NOTE — ED PROVIDER NOTE - SKIN, MLM
Skin normal color for race, warm, dry and intact. No evidence of rash. Lt pubic area-small firm lesion palp., no fluctuant

## 2019-07-09 LAB
CULTURE RESULTS: SIGNIFICANT CHANGE UP
SPECIMEN SOURCE: SIGNIFICANT CHANGE UP

## 2019-10-11 ENCOUNTER — EMERGENCY (EMERGENCY)
Facility: HOSPITAL | Age: 34
LOS: 1 days | Discharge: HOME | End: 2019-10-11
Attending: EMERGENCY MEDICINE
Payer: COMMERCIAL

## 2019-10-11 VITALS
OXYGEN SATURATION: 98 % | SYSTOLIC BLOOD PRESSURE: 137 MMHG | RESPIRATION RATE: 18 BRPM | HEART RATE: 92 BPM | TEMPERATURE: 98 F | HEIGHT: 65 IN | DIASTOLIC BLOOD PRESSURE: 87 MMHG | WEIGHT: 182.98 LBS

## 2019-10-11 DIAGNOSIS — Z98.890 OTHER SPECIFIED POSTPROCEDURAL STATES: Chronic | ICD-10-CM

## 2019-10-11 PROCEDURE — 99284 EMERGENCY DEPT VISIT MOD MDM: CPT

## 2019-10-11 PROCEDURE — 73130 X-RAY EXAM OF HAND: CPT

## 2019-10-11 PROCEDURE — 73130 X-RAY EXAM OF HAND: CPT | Mod: 26,LT

## 2019-10-11 PROCEDURE — 73090 X-RAY EXAM OF FOREARM: CPT

## 2019-10-11 PROCEDURE — 73080 X-RAY EXAM OF ELBOW: CPT | Mod: 26,LT

## 2019-10-11 PROCEDURE — 99284 EMERGENCY DEPT VISIT MOD MDM: CPT | Mod: 25

## 2019-10-11 PROCEDURE — 73090 X-RAY EXAM OF FOREARM: CPT | Mod: 26,LT

## 2019-10-11 PROCEDURE — 73080 X-RAY EXAM OF ELBOW: CPT

## 2019-10-11 RX ORDER — IBUPROFEN 200 MG
600 TABLET ORAL ONCE
Refills: 0 | Status: COMPLETED | OUTPATIENT
Start: 2019-10-11 | End: 2019-10-11

## 2019-10-11 RX ORDER — IBUPROFEN 200 MG
1 TABLET ORAL
Qty: 28 | Refills: 0
Start: 2019-10-11 | End: 2019-10-17

## 2019-10-11 RX ORDER — ACETAMINOPHEN 500 MG
650 TABLET ORAL ONCE
Refills: 0 | Status: COMPLETED | OUTPATIENT
Start: 2019-10-11 | End: 2019-10-11

## 2019-10-11 RX ADMIN — Medication 650 MILLIGRAM(S): at 22:42

## 2019-10-11 RX ADMIN — Medication 600 MILLIGRAM(S): at 22:42

## 2019-10-11 NOTE — ED PROVIDER NOTE - NS_EDPROVIDERDISPOUSERTYPE_ED_A_ED
- - - Scribe Attestation (For Scribes USE Only)... Scribe Attestation (For Scribes USE Only).../Attending Attestation (For Attendings USE Only)...

## 2019-10-11 NOTE — ED PROVIDER NOTE - PATIENT PORTAL LINK FT
You can access the FollowMyHealth Patient Portal offered by [facility] by registering at the following website: [email or web address]. By joining [facility]’s FollowMyHealth portal, you will also be able to view your health information using other applications (apps) compatible with our system.

## 2019-10-11 NOTE — ED PROVIDER NOTE - UPPER EXTREMITY EXAM, LEFT
TENDERNESS/to left olecranon. Mild tenderness to proximal radial and ulnar. Forearm and hand non tender with full range of motion.

## 2019-10-11 NOTE — ED PROVIDER NOTE - OBJECTIVE STATEMENT
32 y/o F pt with no significant PMHx and no significant PSHzx presents to the ED for mechanical fall. Pt states she was at work when high heels got stuck on her pants. Pt notes that she fell forward bilaterally on both knees and left arm. Pt complains of left elbow pain when moving but states the rest of her body feels fine. Pt denies any numbness, tingling, head trauma, LOC or any other complaints. NKDA.

## 2019-10-11 NOTE — ED PROVIDER NOTE - CLINICAL SUMMARY MEDICAL DECISION MAKING FREE TEXT BOX
33 year old female s/p fall. vitals WNL. PE as above.  xrays negative for fracture. pain improved with motrin/tylenol. advised rest, ice, elevation. f/u PMD. return precautions given.

## 2019-10-11 NOTE — ED ADULT TRIAGE NOTE - WEIGHT METHOD
IMPROVE VTE Individual Risk Assessment    RISK                                                          Points  [] Previous VTE                                           3  [] Thrombophilia                                        2  [] Lower limb paralysis                              2   [] Current Cancer                                       2   [x] Immobilization > 24 hrs                        1  [] ICU/CCU stay > 24 hours                       1  [] Age > 60                                                   1    IMPROVE VTE Score: 1  pt with right sided weakness and lethargy, unlikely to mobilize, high risk, on Eliquis for DVT PPx. stated

## 2019-10-17 ENCOUNTER — EMERGENCY (EMERGENCY)
Facility: HOSPITAL | Age: 34
LOS: 1 days | Discharge: HOME | End: 2019-10-17
Attending: EMERGENCY MEDICINE
Payer: COMMERCIAL

## 2019-10-17 VITALS
RESPIRATION RATE: 17 BRPM | HEART RATE: 78 BPM | TEMPERATURE: 98 F | SYSTOLIC BLOOD PRESSURE: 131 MMHG | WEIGHT: 182.98 LBS | OXYGEN SATURATION: 100 % | HEIGHT: 65 IN | DIASTOLIC BLOOD PRESSURE: 85 MMHG

## 2019-10-17 DIAGNOSIS — Z98.890 OTHER SPECIFIED POSTPROCEDURAL STATES: Chronic | ICD-10-CM

## 2019-10-17 PROCEDURE — 99283 EMERGENCY DEPT VISIT LOW MDM: CPT | Mod: 25

## 2019-10-17 PROCEDURE — 29105 APPLICATION LONG ARM SPLINT: CPT | Mod: LT

## 2019-10-17 PROCEDURE — 29105 APPLICATION LONG ARM SPLINT: CPT

## 2019-10-17 NOTE — ED ADULT NURSE NOTE - OBJECTIVE STATEMENT
States she tripped over her pants friday and fell forward ,started to have pain to left arm and abrasions to Bilateral knees .Denies LOC ,head injury.States she was seen in ED Friday and was discharge .States she got a called yesterday and was instructed to return to ED. not sure if she was told left elbow is fractured or broken .Noted to have pain to bruising to left elbow and left upper arm ,with tenderness to left elbow. Noted to have small bruising to bilateral knees , more to right with scab formation over abrasion. Left elbow posterior splint applied by Giancarlo NP. ,Left arm sling applied by Giancarlo NP.

## 2019-10-17 NOTE — ED PROVIDER NOTE - PATIENT PORTAL LINK FT
You can access the FollowMyHealth Patient Portal offered by [facility] by registering at the following website: [email or web address]. By joining [facility]’s FollowMyHealth portal, you will also be able to view your health information using other applications (apps) compatible with our system.

## 2019-10-17 NOTE — ED PROVIDER NOTE - CARE PLAN
Principal Discharge DX:	Closed nondisplaced fracture of head of left radius with routine healing, subsequent encounter

## 2019-10-17 NOTE — ED PROVIDER NOTE - OBJECTIVE STATEMENT
34 y/o F patient with a significant PMHx of Kidney stone and a significant PSHx of exploratory laparotomy presents to the ED with elbow pain.  Patient reports she was walking on Sunday when she tripped over her heal. Patient endorses left elbow pain and bruising. Patient denies numbness, tingling, weakness, and any other complaints. NKDA.

## 2019-10-21 ENCOUNTER — TRANSCRIPTION ENCOUNTER (OUTPATIENT)
Age: 34
End: 2019-10-21

## 2019-10-21 ENCOUNTER — APPOINTMENT (OUTPATIENT)
Dept: ORTHOPEDIC SURGERY | Facility: CLINIC | Age: 34
End: 2019-10-21
Payer: MEDICAID

## 2019-10-21 VITALS
BODY MASS INDEX: 30.45 KG/M2 | WEIGHT: 183 LBS | SYSTOLIC BLOOD PRESSURE: 127 MMHG | HEART RATE: 79 BPM | DIASTOLIC BLOOD PRESSURE: 86 MMHG

## 2019-10-21 VITALS — BODY MASS INDEX: 30.45 KG/M2 | HEIGHT: 65 IN

## 2019-10-21 DIAGNOSIS — Z78.9 OTHER SPECIFIED HEALTH STATUS: ICD-10-CM

## 2019-10-21 PROCEDURE — 99204 OFFICE O/P NEW MOD 45 MIN: CPT

## 2019-10-21 PROCEDURE — 73080 X-RAY EXAM OF ELBOW: CPT | Mod: LT

## 2019-10-21 RX ORDER — DICLOFENAC SODIUM 50 MG/1
50 TABLET, DELAYED RELEASE ORAL
Qty: 30 | Refills: 1 | Status: ACTIVE | COMMUNITY
Start: 2019-10-21 | End: 1900-01-01

## 2019-10-21 NOTE — HISTORY OF PRESENT ILLNESS
[de-identified] : CC L Elbow\par \par HPI 32 yo female right HD presents with acute onset of on 0.5 weeks of constant pain in the lateral left elbow after a fall. The pain is worse, and rated a 8 out of 10, described as aching and burning, with radiation to the wrist. Nothing makes the pain better and everything makes the pain worse. The patient reports associated symptoms of swelling weakness numbness. The patient + pain at night affecting sleep, - neck pain, and - similar pain previously.\par \par The patient has tried the following treatments:\par Activity modification	+\par Ice			+\par Braces    		+\par Nsaids    		+\par Physical Therapy  	-\par Cortisone Injection	-\par Arthroscopy/Surgery	-\par \par Review of Systems is positive for the above musculoskeletal symptoms and is otherwise non-contributory for general, constitutional, psychiatric, neurologic, HEENT, cardiac, respiratory, gastrointestinal, reproductive, lymphatic, and dermatologic complaints.\par \par Consult by

## 2019-10-21 NOTE — PHYSICAL EXAM
[de-identified] : Physical Examination\par General: well nourished, in no acute distress, alert and oriented to person, place and time\par Psychiatric: normal mood and affect, no abnormal movements or speech patterns\par Eyes: vision intact - glasses\par Throat: no thyromegaly\par Lymph: no enlarged nodes, no lymphedema in extremity\par Respiratory: no wheezing, no shortness of breath with ambulation\par Cardiac: no cardiac leg swelling, 2+ peripheral pulses\par Neurology: normal gross sensation in extremities to light touch\par Abdomen: soft, non-tender, tympanic, no masses\par \par Musculoskeletal Examination\par Cervical spine		Full painless range of motion and negative Spurling's test\par \par Elbow     			Right			Left\par Appearance\par      Skin 				normal			ecchymosis proximal forearm\par      Swelling/Deformity		normal			swelling about the lateral arm and forearm\par  Range of Motion\par      Flexion			160			130\par      Extension			0			30\par      Supination			85			85\par      Pronation			90			90\par Palpation\par      Radial Head			-			+\par      Lateral Epicondyle		-			-\par      Medial Epicondyle		-			-\par      Olecranon			-			-\par      Triceps Tendon		-			-\par      Biceps Tendon		-			-\par \par Strength Examination\par      Flexion 			5+ / 0			5+ / 0&\par      Extension			5+ / 0			5+ / 0&\par      Supination			5+ / 0			5+ / 0&\par      Pronation			5+ / 0			5+ / 0&\par      				normal			normal&\par \par Special Examination\par      Milking Posterolateral		-			-&\par      Chair Rise Posteromedial 	-			-&\par      Ulnar Cubital Tunnel Tinnels	-			-&\par      Sublux Ulnar Nerve		-			-&\par      \par Sensation\par      Axillary			normal			normal\par      LatAntCubBrach 		normal			normal\par      Median 			normal			normal\par      Ulnar 			normal			normal\par      Radial 			normal			normal\par Motor\par      AIN 				normal			normal\par      Ulnar 			normal			normal\par      Radial 			normal			normal\par      PIN 				normal			normal\par Pulses\par      Radial			2+			2+\par  [de-identified] : 3 views of the L elbow (AP, lateral and radial head)\par were ordered, obtained and evaluated by myself today and\par demonstrate:\par The radial head aligns with the capitellum on all views\par no degenerative joint disease\par Nondisplaced to minimal displaced radial head fracture\par - dislocation \par Otherwise normal osseous bone structure and soft tissue contour\par

## 2019-10-21 NOTE — DISCUSSION/SUMMARY
[de-identified] : Left radial head fracture\par \par Nonweightbearing left upper extremity\par \par Recommend no splint\par \par Recommend sling for no more than 1-2 weeks more\par \par Recommend elbow range of motion\par \par Elevate and ice\par \par The patient was prescribed Diclofenac PO non-steroidal anti-inflammatory medication. 50mg tablets twice daily to be taken for at least 1-2 weeks in a row and then PRN afterwards. Risks and benefits were discussed and include but not limited to renal damage and GI ulceration and bleeding.  They were advised to take with food to limit stomach upset as well as warned to stop the medication if worsening gastric pain or dizziness or other side effects. 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

## 2019-10-22 ENCOUNTER — APPOINTMENT (OUTPATIENT)
Dept: ENDOCRINOLOGY | Facility: CLINIC | Age: 34
End: 2019-10-22
Payer: MEDICAID

## 2019-10-22 VITALS
WEIGHT: 186 LBS | RESPIRATION RATE: 16 BRPM | HEIGHT: 65 IN | TEMPERATURE: 98 F | HEART RATE: 80 BPM | BODY MASS INDEX: 30.99 KG/M2 | DIASTOLIC BLOOD PRESSURE: 85 MMHG | SYSTOLIC BLOOD PRESSURE: 123 MMHG | OXYGEN SATURATION: 100 %

## 2019-10-22 PROCEDURE — 99214 OFFICE O/P EST MOD 30 MIN: CPT

## 2019-10-22 NOTE — PHYSICAL EXAM
[Alert] : alert [No Acute Distress] : no acute distress [Normal Sclera/Conjunctiva] : normal sclera/conjunctiva [PERRL] : pupils equal, round and reactive to light [Normal Outer Ear/Nose] : the ears and nose were normal in appearance [Thyroid Not Enlarged] : the thyroid was not enlarged [No Neck Mass] : no neck mass was observed [Normal Hearing] : hearing was normal [Normal Rate and Effort] : normal respiratory rhythm and effort [No Respiratory Distress] : no respiratory distress [Normal PMI] : the apical impulse was normal [No CVA Tenderness] : no ~M costovertebral angle tenderness [Normal Rate] : heart rate was normal  [Carotids Normal] : carotid pulses were normal with no bruits [Normal Gait] : normal gait [No Spinal Tenderness] : no spinal tenderness [No Clubbing, Cyanosis] : no clubbing  or cyanosis of the fingernails [No Rash] : no rash [No Motor Deficits] : the motor exam was normal [Hirsutism] : hirsutism [Normal Reflexes] : deep tendon reflexes were 2+ and symmetric [de-identified] : on her face

## 2019-10-22 NOTE — HISTORY OF PRESENT ILLNESS
[FreeTextEntry1] : The patient feels well, she was at the ER found to have a small lesion in top of the kidney. She has history of elevated DHEA-S with normal free testosterone. Her periods are normal. She is not taking any medications. The hirsutism remains the same. The CT scan with contrast done at [facility] disclosed a 4.1 cm  Adrenal Myelolipoma. Her daughter is on Metformin for hirsutism.

## 2019-10-22 NOTE — ASSESSMENT
[FreeTextEntry1] : The patient has an adrenal myelolipoma\par She is normotensive\par She has hirsutism with elevated DHEA-S \par Will do a work up to rule out a functional adrenal tumor

## 2019-10-31 ENCOUNTER — APPOINTMENT (OUTPATIENT)
Dept: ENDOCRINOLOGY | Facility: CLINIC | Age: 34
End: 2019-10-31
Payer: MEDICAID

## 2019-10-31 VITALS
HEART RATE: 120 BPM | SYSTOLIC BLOOD PRESSURE: 131 MMHG | OXYGEN SATURATION: 98 % | TEMPERATURE: 98.2 F | WEIGHT: 185 LBS | DIASTOLIC BLOOD PRESSURE: 78 MMHG | BODY MASS INDEX: 30.82 KG/M2 | HEIGHT: 65 IN | RESPIRATION RATE: 12 BRPM

## 2019-10-31 DIAGNOSIS — B96.89 ACUTE VAGINITIS: ICD-10-CM

## 2019-10-31 DIAGNOSIS — R79.89 OTHER SPECIFIED ABNORMAL FINDINGS OF BLOOD CHEMISTRY: ICD-10-CM

## 2019-10-31 DIAGNOSIS — N76.0 ACUTE VAGINITIS: ICD-10-CM

## 2019-10-31 PROCEDURE — 99214 OFFICE O/P EST MOD 30 MIN: CPT

## 2019-10-31 NOTE — HISTORY OF PRESENT ILLNESS
[FreeTextEntry1] : The patient had a normal endocrine work up except for the DHEA-S that is elevated. The CT scan of the abdomen with contrast revealed that she has  a 4.1 cm myelolipoma in the adrenal gland. She will be referred to Dr. Lewis for evaluation. She has persistent hirsutism and she is unable to lose weight.

## 2019-10-31 NOTE — ASSESSMENT
[FreeTextEntry1] : Patient has hirsutism\par Will start Metformin 500 mg po QD\par Will also start Aldactone 25 mg po QD\par She needs to lose weight and lower the DHEA-S\par To see Dr. Lewis for evaluation of the adrenal myelolipoma\par Will repeat the hormones in 2 months

## 2019-10-31 NOTE — PHYSICAL EXAM
[Alert] : alert [No Acute Distress] : no acute distress [Normal Sclera/Conjunctiva] : normal sclera/conjunctiva [PERRL] : pupils equal, round and reactive to light [Normal Outer Ear/Nose] : the ears and nose were normal in appearance [Normal Hearing] : hearing was normal [No Neck Mass] : no neck mass was observed [Thyroid Not Enlarged] : the thyroid was not enlarged [No Respiratory Distress] : no respiratory distress [Normal Rate and Effort] : normal respiratory rhythm and effort [Normal PMI] : the apical impulse was normal [Normal Rate] : heart rate was normal  [Carotids Normal] : carotid pulses were normal with no bruits [No CVA Tenderness] : no ~M costovertebral angle tenderness [No Spinal Tenderness] : no spinal tenderness [Normal Gait] : normal gait [No Clubbing, Cyanosis] : no clubbing  or cyanosis of the fingernails [No Rash] : no rash [Hirsutism] : hirsutism [Normal Reflexes] : deep tendon reflexes were 2+ and symmetric [No Motor Deficits] : the motor exam was normal [de-identified] : on her face

## 2019-11-04 ENCOUNTER — APPOINTMENT (OUTPATIENT)
Dept: ORTHOPEDIC SURGERY | Facility: CLINIC | Age: 34
End: 2019-11-04

## 2019-11-11 ENCOUNTER — APPOINTMENT (OUTPATIENT)
Dept: ORTHOPEDIC SURGERY | Facility: CLINIC | Age: 34
End: 2019-11-11
Payer: SELF-PAY

## 2019-11-11 DIAGNOSIS — S52.125A NONDISPLACED FRACTURE OF HEAD OF LEFT RADIUS, INITIAL ENCOUNTER FOR CLOSED FRACTURE: ICD-10-CM

## 2019-11-11 PROCEDURE — 99213 OFFICE O/P EST LOW 20 MIN: CPT

## 2019-11-11 PROCEDURE — 73080 X-RAY EXAM OF ELBOW: CPT | Mod: LT

## 2019-11-11 NOTE — HISTORY OF PRESENT ILLNESS
[de-identified] : CC L Elbow\par \par HPI 32 yo female right HD presents 3 week FU of acute onset of on 0.5 weeks of constant pain in the lateral left elbow after a fall. The pain is worse, and rated a 8 out of 10, described as aching and burning, with radiation to the wrist. Nothing makes the pain better and everything makes the pain worse. The patient reports associated symptoms of swelling weakness numbness. The patient + pain at night affecting sleep, - neck pain, and - similar pain previously.\par \par The patient has tried the following treatments:\par Activity modification	+\par Ice			+\par Braces    		+\par Nsaids    		+\par Physical Therapy  	-\par Cortisone Injection	-\par Arthroscopy/Surgery	-\par \par pain better, reports still has pain w lifting and moving\par Review of Systems is positive for the above musculoskeletal symptoms and is otherwise non-contributory for general, constitutional, psychiatric, neurologic, HEENT, cardiac, respiratory, gastrointestinal, reproductive, lymphatic, and dermatologic complaints.\par \par Consult by

## 2019-11-11 NOTE — DISCUSSION/SUMMARY
[de-identified] : Left radial head fracture\par \par 5# WB left upper extremity\par \par Recommend elbow range of motion\par \par Elevate and ice\par \par PRN prescribed Diclofenac PO non-steroidal anti-inflammatory medication. 50mg tablets twice daily to be taken for at least 1-2 weeks in a row and then PRN afterwards. Risks and benefits were discussed and include but not limited to renal damage and GI ulceration and bleeding.  They were advised to take with food to limit stomach upset as well as warned to stop the medication if worsening gastric pain or dizziness or other side effects. Also to immediately stop the medication and seek appropriate medical attention if any severe stomach ache, gastritis, black/red vomit, black/red stools or any other medical concern.\par \par light duty <5lbs only 4 weeks\par \par Followup in 4 weeks

## 2019-11-11 NOTE — PHYSICAL EXAM
[de-identified] : Physical Examination\par General: well nourished, in no acute distress, alert and oriented to person, place and time\par Psychiatric: normal mood and affect, no abnormal movements or speech patterns\par Eyes: vision intact - glasses\par Throat: no thyromegaly\par Lymph: no enlarged nodes, no lymphedema in extremity\par Respiratory: no wheezing, no shortness of breath with ambulation\par Cardiac: no cardiac leg swelling, 2+ peripheral pulses\par Neurology: normal gross sensation in extremities to light touch\par Abdomen: soft, non-tender, tympanic, no masses\par \par Musculoskeletal Examination\par Cervical spine		Full painless range of motion and negative Spurling's test\par \par Elbow     			Right			Left\par Appearance\par      Skin 				normal			resolved ecchymosis proximal forearm\par      Swelling/Deformity		normal			resolved swelling about the lateral arm and forearm\par  Range of Motion\par      Flexion			160			160\par      Extension			0			0\par      Supination			85			85\par      Pronation			90			90\par Palpation\par      Radial Head			-			+\par      Lateral Epicondyle		-			+\par      Medial Epicondyle		-			-\par      Olecranon			-			+\par      Triceps Tendon		-			-\par      Biceps Tendon		-			-\par \par Strength Examination\par      Flexion 			5+ / 0			5+ / 0&\par      Extension			5+ / 0			5+ / 0&\par      Supination			5+ / 0			5+ / 0&\par      Pronation			5+ / 0			5+ / 0&\par      				normal			normal&\par \par Special Examination\par      Milking Posterolateral		-			-\par      Chair Rise Posteromedial 	-			-\par      Ulnar Cubital Tunnel Tinnels	-			-\par      Sublux Ulnar Nerve		-			-\par      \par Sensation\par      Axillary			normal			normal\par      LatAntCubBrach 		normal			normal\par      Median 			normal			normal\par      Ulnar 			normal			normal\par      Radial 			normal			normal\par Motor\par      AIN 				normal			normal\par      Ulnar 			normal			normal\par      Radial 			normal			normal\par      PIN 				normal			normal\par Pulses\par      Radial			2+			2+\par  [de-identified] : 3 views of the L elbow (AP, lateral and radial head)\par 11-21-19\par demonstrate:\par The radial head aligns with the capitellum on all views\par no degenerative joint disease\par Nondisplaced to minimal displaced radial head fracture\par - dislocation \par Otherwise normal osseous bone structure and soft tissue contour\par \par \par 3 views of the L elbow (AP, lateral and radial head)\par were ordered, obtained and evaluated by myself today and\par demonstrate:\par The radial head aligns with the capitellum on all views\par no degenerative joint disease\par Nondisplaced to minimal displaced radial head fracture without significant change\par - dislocation \par Otherwise normal osseous bone structure and soft tissue contour\par

## 2019-12-16 ENCOUNTER — APPOINTMENT (OUTPATIENT)
Dept: ORTHOPEDIC SURGERY | Facility: CLINIC | Age: 34
End: 2019-12-16

## 2019-12-19 ENCOUNTER — APPOINTMENT (OUTPATIENT)
Dept: ENDOCRINOLOGY | Facility: CLINIC | Age: 34
End: 2019-12-19

## 2020-10-19 ENCOUNTER — LABORATORY RESULT (OUTPATIENT)
Age: 35
End: 2020-10-19

## 2020-10-19 ENCOUNTER — APPOINTMENT (OUTPATIENT)
Dept: OBGYN | Facility: CLINIC | Age: 35
End: 2020-10-19
Payer: COMMERCIAL

## 2020-10-19 ENCOUNTER — ASOB RESULT (OUTPATIENT)
Age: 35
End: 2020-10-19

## 2020-10-19 VITALS — DIASTOLIC BLOOD PRESSURE: 84 MMHG | BODY MASS INDEX: 32.12 KG/M2 | WEIGHT: 193 LBS | SYSTOLIC BLOOD PRESSURE: 126 MMHG

## 2020-10-19 PROCEDURE — 76856 US EXAM PELVIC COMPLETE: CPT

## 2020-10-19 PROCEDURE — 99395 PREV VISIT EST AGE 18-39: CPT

## 2020-10-19 PROCEDURE — 99072 ADDL SUPL MATRL&STAF TM PHE: CPT

## 2020-10-19 NOTE — HISTORY OF PRESENT ILLNESS
[TextBox_4] : pt comes for pap . She also feels ball in the back of her vagina , especially when pushing or going to defecate . No pains or incontinence . She wants to fix it .

## 2020-10-19 NOTE — PHYSICAL EXAM
[Appropriately responsive] : appropriately responsive [Alert] : alert [No Acute Distress] : no acute distress [No Lymphadenopathy] : no lymphadenopathy [No Murmurs] : no murmurs [Regular Rate Rhythm] : regular rate rhythm [Clear to Auscultation B/L] : clear to auscultation bilaterally [Soft] : soft [Non-tender] : non-tender [Non-distended] : non-distended [No HSM] : No HSM [No Lesions] : no lesions [No Mass] : no mass [Oriented x3] : oriented x3 [Examination Of The Breasts] : a normal appearance [No Masses] : no breast masses were palpable [Labia Majora] : normal [Labia Minora] : normal [Rectocele] : a rectocele [Uterine Adnexae] : normal [Normal] : normal [FreeTextEntry4] : 2nd degree rectocele .

## 2020-12-14 ENCOUNTER — APPOINTMENT (OUTPATIENT)
Dept: ENDOCRINOLOGY | Facility: CLINIC | Age: 35
End: 2020-12-14
Payer: COMMERCIAL

## 2020-12-14 VITALS
SYSTOLIC BLOOD PRESSURE: 134 MMHG | TEMPERATURE: 98.3 F | DIASTOLIC BLOOD PRESSURE: 84 MMHG | OXYGEN SATURATION: 100 % | HEIGHT: 65 IN | WEIGHT: 185 LBS | BODY MASS INDEX: 30.82 KG/M2 | HEART RATE: 74 BPM | RESPIRATION RATE: 16 BRPM

## 2020-12-14 PROCEDURE — 99072 ADDL SUPL MATRL&STAF TM PHE: CPT

## 2020-12-14 PROCEDURE — 99214 OFFICE O/P EST MOD 30 MIN: CPT

## 2020-12-14 RX ORDER — DEXAMETHASONE 1 MG/1
1 TABLET ORAL
Qty: 1 | Refills: 1 | Status: COMPLETED | COMMUNITY
Start: 2018-09-18 | End: 2020-12-14

## 2020-12-14 RX ORDER — SPIRONOLACTONE 25 MG/1
25 TABLET ORAL DAILY
Qty: 90 | Refills: 3 | Status: COMPLETED | COMMUNITY
Start: 2018-10-18 | End: 2020-12-14

## 2020-12-14 RX ORDER — DEXAMETHASONE 1 MG/1
1 TABLET ORAL
Qty: 1 | Refills: 1 | Status: COMPLETED | COMMUNITY
Start: 2019-10-22 | End: 2020-12-14

## 2020-12-14 NOTE — ASSESSMENT
[FreeTextEntry1] : Advised again to see The surgical Oncologist Dr. Lewis\par Will repeat the adrenal gland hormones\par Will restart the medications\par Will raise the Aldactone dose to 25 mcg po BID.\par Patient will call me back for results

## 2020-12-14 NOTE — HISTORY OF PRESENT ILLNESS
[FreeTextEntry1] : The patient run out of her insurance, she was unable to see Dr. Lewis. She could not get the metformin or Aldactone. The hirsutism is worse.

## 2020-12-14 NOTE — DATA REVIEWED
[FreeTextEntry1] : An endocrine work up was done to determine if the adrenal lesion was functional. The DHEA-S was the only one elevated. The overnight dexamethasone suppression tests was negative.

## 2020-12-14 NOTE — PHYSICAL EXAM
[Alert] : alert [No Acute Distress] : no acute distress [No Neck Mass] : no neck mass was observed [Thyroid Not Enlarged] : the thyroid was not enlarged [No Respiratory Distress] : no respiratory distress [Clear to Auscultation] : lungs were clear to auscultation bilaterally [Normal PMI] : the apical impulse was normal [Normal Rate] : heart rate was normal [Hirsutism] : hirsutism present

## 2020-12-15 PROBLEM — N76.0 VAGINITIS AND VULVOVAGINITIS: Status: RESOLVED | Noted: 2017-12-27 | Resolved: 2020-12-15

## 2020-12-19 ENCOUNTER — NON-APPOINTMENT (OUTPATIENT)
Age: 35
End: 2020-12-19

## 2020-12-19 ENCOUNTER — APPOINTMENT (OUTPATIENT)
Dept: DISASTER EMERGENCY | Facility: CLINIC | Age: 35
End: 2020-12-19

## 2021-01-03 ENCOUNTER — EMERGENCY (EMERGENCY)
Facility: HOSPITAL | Age: 36
LOS: 1 days | Discharge: HOME | End: 2021-01-03
Attending: EMERGENCY MEDICINE | Admitting: EMERGENCY MEDICINE
Payer: COMMERCIAL

## 2021-01-03 VITALS
HEART RATE: 92 BPM | SYSTOLIC BLOOD PRESSURE: 134 MMHG | DIASTOLIC BLOOD PRESSURE: 89 MMHG | OXYGEN SATURATION: 100 % | TEMPERATURE: 97 F | RESPIRATION RATE: 18 BRPM

## 2021-01-03 VITALS
DIASTOLIC BLOOD PRESSURE: 100 MMHG | HEART RATE: 103 BPM | TEMPERATURE: 98 F | HEIGHT: 65 IN | RESPIRATION RATE: 18 BRPM | OXYGEN SATURATION: 100 % | SYSTOLIC BLOOD PRESSURE: 130 MMHG

## 2021-01-03 DIAGNOSIS — Z98.890 OTHER SPECIFIED POSTPROCEDURAL STATES: Chronic | ICD-10-CM

## 2021-01-03 PROCEDURE — 99282 EMERGENCY DEPT VISIT SF MDM: CPT

## 2021-01-03 RX ORDER — DICLOFENAC SODIUM 75 MG/1
1 TABLET, DELAYED RELEASE ORAL
Qty: 10 | Refills: 0
Start: 2021-01-03 | End: 2021-01-07

## 2021-01-03 NOTE — ED PROVIDER NOTE - PATIENT PORTAL LINK FT
You can access the FollowMyHealth Patient Portal offered by [facility] by registering at the following website: [email or web address]. By joining [employer]’s FollowMyHealth portal, you will also be able to view your health information using other applications (apps) compatible with our system.

## 2021-01-03 NOTE — ED PROVIDER NOTE - PHYSICAL EXAMINATION
VS:  unremarkable except tachycardia    GEN - NAD;   well appearing;   A+O x3   HEAD - NC/AT     ENT - PEERL, EOMI, mucous membranes    moist , no discharge    L upper eyelid about 5mm spherical swelling just above and to the left of the midpoint pupil on upper eyelid just above the lid margin.  Eye conjunctiva normal, no purulence expressible, cornea clear, reports vision normal.  No general eyelid swelling, swelling is well circumscribed.  no proptosis.  EOMS intact.    NECK: Neck supple, non-tender without lymphadenopathy, no masses, no JVD  PULM - CTA b/l,  symmetric breath sounds  COR -  normal heart sounds    ABD - , ND, NT, soft,  BACK - no CVA tenderness, nontender spine     EXTREMS - no edema, no deformity, warm and well perfused    SKIN - no rash    or bruising      NEUROLOGIC - alert, face symmetric, speech fluent, sensation nl, motor no focal deficit.

## 2021-01-03 NOTE — ED PROVIDER NOTE - NSFOLLOWUPINSTRUCTIONS_ED_ALL_ED_FT
WARM COMPRESSES TO EYE EVERY 2-3 HOURS  USE ERYTHROMYCIN OINTMENT TO EYE AT NIGHT  **PLEASE FOLLOW UP WITH [facility] TOMORROW**    [facility]  [address]  [phone]

## 2021-01-03 NOTE — ED PROVIDER NOTE - ATTENDING CONTRIBUTION TO CARE
35F p/w L eyelid swelling, redness and pain. Pt had fake eyelashes in, took them out them out, then developed eyelid swelling to L upper eye last few days, has been using steroid / abx ointment topically rx'ed by ?optometrist but it hasn't been helping it.  Some pain with blinking, no drainage.  Somewhat firm and cystic reddened lesion to L upper eyelid-chalazion vs stye.  Rx e-mycin ointment, follow up with ophthal soon.  Pt reports using hot packs also.  VS:  unremarkable except tachycardia    GEN - NAD;   well appearing;   A+O x3   HEAD - NC/AT     ENT - PEERL, EOMI, mucous membranes    moist , no discharge    L upper eyelid about 5mm spherical swelling just above and to the left of the midpoint pupil on upper eyelid just above the lid margin.  Eye conjunctiva normal, no purulence expressible, cornea clear, reports vision normal.  No general eyelid swelling, swelling is well circumscribed.  no proptosis.  EOMS intact.    NECK: Neck supple, non-tender without lymphadenopathy, no masses, no JVD  PULM - CTA b/l,  symmetric breath sounds  COR -  normal heart sounds    ABD - , ND, NT, soft,  BACK - no CVA tenderness, nontender spine     EXTREMS - no edema, no deformity, warm and well perfused    SKIN - no rash    or bruising      NEUROLOGIC - alert, face symmetric, speech fluent, sensation nl, motor no focal deficit.

## 2021-01-03 NOTE — ED PROVIDER NOTE - OBJECTIVE STATEMENT
35 yr old F with no PMHx presents to the ED c/o left upper eyelid redness and swelling x 3 days. Pt states had fake eyelashes on and started developing redness and swelling to left upper eyelid. Pt took eyelashes out and states still developing worsening swelling to left upper eyelid. Pt went to eye doctor 2 days ago and was prescribed a steroid antibiotic cream which has not been helping. Pt states pain with blinking. Denies any blurry vision, bleeding, purulent drainage, fever, or chills. Pt is a nonsmoker, non-drinker. No known drug allergies.

## 2021-01-03 NOTE — ED PROVIDER NOTE - CARE PLAN
[] : The components of the vaccine(s) to be administered today are listed in the plan of care. The disease(s) for which the vaccine(s) are intended to prevent and the risks have been discussed with the caretaker.  The risks are also included in the appropriate vaccination information statements which have been provided to the patient's caregiver.  The caregiver has given consent to vaccinate. [FreeTextEntry1] : Recommend exclusive breastfeeding, 8-12 feedings per day. Mother should continue prenatal vitamins and avoid alcohol. If formula is needed, recommend iron-fortified formulations, 2-4 oz every 2-3 hrs. When in car, patient should be in rear-facing car seat in back seat. Put baby to sleep on back, in own crib with no loose or soft bedding. Help baby to develop sleep and feeding routines. May offer pacifier if needed. Start tummy time when awake. Limit baby's exposure to others, especially those with fever or unknown vaccine status. Parents counseled to call if rectal temperature >100.4 degrees F.\par \par Return at 2 months Principal Discharge DX:	Eyelid pain   Principal Discharge DX:	Eyelid pain  Secondary Diagnosis:	Stye

## 2021-01-03 NOTE — ED PROVIDER NOTE - CLINICAL SUMMARY MEDICAL DECISION MAKING FREE TEXT BOX
35 yr old F c/o left upper eyelid swelling x 3 days. Probably stye vs. chalazion. Does not warrant urgent ER intervention. Plan for outpatient opthalmology clinic f/u tomorrow.

## 2021-01-05 ENCOUNTER — APPOINTMENT (OUTPATIENT)
Dept: OPHTHALMOLOGY | Facility: CLINIC | Age: 36
End: 2021-01-05
Payer: COMMERCIAL

## 2021-01-05 ENCOUNTER — NON-APPOINTMENT (OUTPATIENT)
Age: 36
End: 2021-01-05

## 2021-01-05 PROCEDURE — 92004 COMPRE OPH EXAM NEW PT 1/>: CPT

## 2021-01-05 PROCEDURE — 99072 ADDL SUPL MATRL&STAF TM PHE: CPT

## 2021-01-12 ENCOUNTER — APPOINTMENT (OUTPATIENT)
Dept: OPHTHALMOLOGY | Facility: CLINIC | Age: 36
End: 2021-01-12
Payer: COMMERCIAL

## 2021-01-12 ENCOUNTER — NON-APPOINTMENT (OUTPATIENT)
Age: 36
End: 2021-01-12

## 2021-01-12 PROCEDURE — 67820 REVISE EYELASHES: CPT | Mod: E4

## 2021-01-12 PROCEDURE — 99072 ADDL SUPL MATRL&STAF TM PHE: CPT

## 2021-01-12 PROCEDURE — 92012 INTRM OPH EXAM EST PATIENT: CPT | Mod: 25

## 2021-01-21 ENCOUNTER — NON-APPOINTMENT (OUTPATIENT)
Age: 36
End: 2021-01-21

## 2021-01-25 ENCOUNTER — APPOINTMENT (OUTPATIENT)
Dept: ENDOCRINOLOGY | Facility: CLINIC | Age: 36
End: 2021-01-25

## 2021-03-30 NOTE — ED PROVIDER NOTE - DATE/TIME 1
Case is already been reviewed with neurology, they are recommending evaluation with MRI and a stroke work-up.  Patient will be placed on observation status for MRI, I will check a lipid panel in the morning, telemetry monitoring overnight, hold on remainder of stroke work-up (echocardiogram, carotid ultrasound, PT, OT, ST, IPR Cx) and pursue these additional work-ups if MRI is positive.  On the whole this may be more consistent with a recurrent Bell's palsy, still evolving.  Deferred to neurology.  
Home dose Metformin will be held, managed with sliding scale insulin.  Basal and prandial insulins will be held at the advice of pharmacy.  These can be initiated on hospital day 1 if indicated.  
Replace, recheck in AM.  Add on mag levels.  
08-Jul-2019 15:33

## 2021-06-09 ENCOUNTER — APPOINTMENT (OUTPATIENT)
Dept: OBGYN | Facility: CLINIC | Age: 36
End: 2021-06-09
Payer: COMMERCIAL

## 2021-06-09 VITALS — BODY MASS INDEX: 29.12 KG/M2 | SYSTOLIC BLOOD PRESSURE: 130 MMHG | WEIGHT: 175 LBS | DIASTOLIC BLOOD PRESSURE: 78 MMHG

## 2021-06-09 PROCEDURE — 99212 OFFICE O/P EST SF 10 MIN: CPT

## 2021-06-09 PROCEDURE — 99072 ADDL SUPL MATRL&STAF TM PHE: CPT

## 2021-06-09 RX ORDER — FLUCONAZOLE 150 MG/1
150 TABLET ORAL
Qty: 1 | Refills: 0 | Status: ACTIVE | COMMUNITY
Start: 2017-09-12 | End: 1900-01-01

## 2021-06-09 NOTE — PHYSICAL EXAM
[Appropriately responsive] : appropriately responsive [Alert] : alert [No Acute Distress] : no acute distress [No Lymphadenopathy] : no lymphadenopathy [Regular Rate Rhythm] : regular rate rhythm [No Murmurs] : no murmurs [Clear to Auscultation B/L] : clear to auscultation bilaterally [Soft] : soft [Non-tender] : non-tender [Non-distended] : non-distended [No HSM] : No HSM [No Lesions] : no lesions [No Mass] : no mass [Oriented x3] : oriented x3 [Labia Majora] : normal [Labia Minora] : normal [Erythematous] : erythema [Discharge] : a  ~M vaginal discharge was present [Normal] : normal [Uterine Adnexae] : normal

## 2021-06-09 NOTE — HISTORY OF PRESENT ILLNESS
[FreeTextEntry1] : Patient presents with concern for yeast infection.  Patient reports that she has a lot of itchiness and a white discharge.  Has tried a cream and hasn’t really helped.  Up to date on pap

## 2021-09-07 ENCOUNTER — EMERGENCY (EMERGENCY)
Facility: HOSPITAL | Age: 36
LOS: 1 days | Discharge: HOME | End: 2021-09-07
Attending: EMERGENCY MEDICINE
Payer: COMMERCIAL

## 2021-09-07 VITALS
HEART RATE: 99 BPM | OXYGEN SATURATION: 100 % | HEIGHT: 65 IN | RESPIRATION RATE: 20 BRPM | SYSTOLIC BLOOD PRESSURE: 140 MMHG | WEIGHT: 176.15 LBS | DIASTOLIC BLOOD PRESSURE: 86 MMHG | TEMPERATURE: 98 F

## 2021-09-07 DIAGNOSIS — Z98.890 OTHER SPECIFIED POSTPROCEDURAL STATES: Chronic | ICD-10-CM

## 2021-09-07 PROCEDURE — 99284 EMERGENCY DEPT VISIT MOD MDM: CPT

## 2021-09-07 NOTE — ED ADULT TRIAGE NOTE - CHIEF COMPLAINT QUOTE
Pt states that she was at the beach in [facility] 3 days ago and the next day noticed a small red spot on her right thigh that progressed into large clusters of blisters with burning sensation.  She also endorsed lower back pain and headache.  Denies n/v, urinary symptoms, and recent trauma.

## 2021-09-08 LAB
APPEARANCE UR: CLEAR — SIGNIFICANT CHANGE UP
BILIRUB UR-MCNC: NEGATIVE — SIGNIFICANT CHANGE UP
COLOR SPEC: YELLOW — SIGNIFICANT CHANGE UP
DIFF PNL FLD: ABNORMAL
GLUCOSE UR QL: NEGATIVE — SIGNIFICANT CHANGE UP
HCG UR QL: NEGATIVE — SIGNIFICANT CHANGE UP
KETONES UR-MCNC: NEGATIVE — SIGNIFICANT CHANGE UP
LEUKOCYTE ESTERASE UR-ACNC: ABNORMAL
NITRITE UR-MCNC: NEGATIVE — SIGNIFICANT CHANGE UP
PH UR: 6.5 — SIGNIFICANT CHANGE UP (ref 5–8)
PROT UR-MCNC: NEGATIVE — SIGNIFICANT CHANGE UP
SP GR SPEC: 1.01 — SIGNIFICANT CHANGE UP (ref 1.01–1.02)
UROBILINOGEN FLD QL: NEGATIVE — SIGNIFICANT CHANGE UP

## 2021-09-08 PROCEDURE — 81001 URINALYSIS AUTO W/SCOPE: CPT

## 2021-09-08 PROCEDURE — 99283 EMERGENCY DEPT VISIT LOW MDM: CPT

## 2021-09-08 PROCEDURE — 81025 URINE PREGNANCY TEST: CPT

## 2021-09-08 RX ORDER — IBUPROFEN 200 MG
1 TABLET ORAL
Qty: 20 | Refills: 0
Start: 2021-09-08

## 2021-09-08 RX ORDER — IBUPROFEN 200 MG
600 TABLET ORAL ONCE
Refills: 0 | Status: COMPLETED | OUTPATIENT
Start: 2021-09-08 | End: 2021-09-08

## 2021-09-08 RX ORDER — VALACYCLOVIR 500 MG/1
1 TABLET, FILM COATED ORAL
Qty: 21 | Refills: 0
Start: 2021-09-08 | End: 2021-09-14

## 2021-09-08 RX ADMIN — Medication 600 MILLIGRAM(S): at 01:00

## 2021-09-08 RX ADMIN — Medication 600 MILLIGRAM(S): at 01:30

## 2021-09-08 NOTE — ED ADULT NURSE NOTE - NSIMPLEMENTINTERV_GEN_ALL_ED
Implemented All Universal Safety Interventions:  [place] to call system. Call bell, personal items and telephone within reach. Instruct patient to call for assistance. Room bathroom lighting operational. Non-slip footwear when patient is off stretcher. Physically safe environment: no spills, clutter or unnecessary equipment. Stretcher in lowest position, wheels locked, appropriate side rails in place.

## 2021-09-08 NOTE — ED ADULT NURSE NOTE - CHIEF COMPLAINT QUOTE
Pt states that she was at the beach in [facility] 3 days ago and the next day noticed a small red spot on her right thigh that progressed into large clusters of blisters with burning sensation.  She also endorsed lower back pain and headache.  Denies n/v, urinary symptoms, and recent trauma.

## 2021-09-08 NOTE — ED PROVIDER NOTE - PATIENT PORTAL LINK FT
You can access the FollowMyHealth Patient Portal offered by [facility] by registering at the following website: [email or web address]. By joining [employer]’s FollowMyHealth portal, you will also be able to view your health information using other applications (apps) compatible with our system.

## 2021-09-08 NOTE — ED PROVIDER NOTE - NSFOLLOWUPINSTRUCTIONS_ED_ALL_ED_FT
Shingles    WHAT YOU NEED TO KNOW:    Shingles is a painful rash. Shingles is caused by the same virus that causes chickenpox (varicella-zoster). After you get chickenpox, the virus stays in your body for several years without causing any symptoms. Shingles occurs when the virus becomes active again. The active virus travels along a nerve to your skin and causes a rash.    Shingles         DISCHARGE INSTRUCTIONS:    Call your local emergency number (911 in the ) if:   •You have trouble moving your arms, legs, or face.      •You become confused, or have difficulty speaking.      •You have a seizure.      Return to the emergency department if:   •You have weakness in an arm or leg.      •You have dizziness, a severe headache, or hearing or vision loss.      •You have painful, red, warm skin around the blisters, or the blisters drain pus.      •Your neck is stiff or you have trouble moving it.      Call your doctor if:   •You feel weak or have a headache.      •You have a cough, chills, or a fever.      •You have abdominal pain or nausea, or you are vomiting.      •Your rash becomes more itchy or painful.      •Your rash spreads to other parts of your body.      •Your pain worsens and does not go away even after you take medicine.      •You have questions or concerns about your condition or care.      Medicines: You may need any of the following:  •Antiviral medicine helps decrease symptoms and healing time. They may also decrease your risk of developing nerve pain. You will need to start taking them within 3 days of the start of symptoms to prevent nerve pain.      •Prescription pain medicine may be given. Ask your healthcare provider how to take this medicine safely. Some prescription pain medicines contain acetaminophen. Do not take other medicines that contain acetaminophen without talking to your healthcare provider. Too much acetaminophen may cause liver damage. Prescription pain medicine may cause constipation. Ask your healthcare provider how to prevent or treat constipation.       •Acetaminophen decreases pain and fever. It is available without a doctor's order. Ask how much to take and how often to take it. Follow directions. Read the labels of all other medicines you are using to see if they also contain acetaminophen, or ask your doctor or pharmacist. Acetaminophen can cause liver damage if not taken correctly. Do not use more than 4 grams (4,000 milligrams) total of acetaminophen in one day.       •NSAIDs, such as ibuprofen, help decrease swelling, pain, and fever. This medicine is available with or without a doctor's order. NSAIDs can cause stomach bleeding or kidney problems in certain people. If you take blood thinner medicine, always ask if NSAIDs are safe for you. Always read the medicine label and follow directions. Do not give these medicines to children under 6 months of age without direction from your child's healthcare provider.      •Topical anesthetics are used to numb the skin and decrease pain. They can be a cream, gel, spray, or patch.      •Anticonvulsants decrease nerve pain and may help you sleep at night.      •Antidepressants may be used to decrease nerve pain.      •Take your medicine as directed. Contact your healthcare provider if you think your medicine is not helping or if you have side effects. Tell him of her if you are allergic to any medicine. Keep a list of the medicines, vitamins, and herbs you take. Include the amounts, and when and why you take them. Bring the list or the pill bottles to follow-up visits. Carry your medicine list with you in case of an emergency.      Self-care: Keep your rash clean and dry. Cover your rash with a bandage or clothing. Do not use bandages that stick to your skin. The sticky part may irritate your skin and make your rash last longer.    Prevent the spread of germs:          •Wash your hands often. Wash your hands several times each day. Wash after you use the bathroom, change a child's diaper, and before you prepare or eat food. Use soap and water every time. Rub your soapy hands together, lacing your fingers. Wash the front and back of your hands, and in between your fingers. Use the fingers of one hand to scrub under the fingernails of the other hand. Wash for at least 20 seconds. Rinse with warm, running water for several seconds. Then dry your hands with a clean towel or paper towel. Use hand  that contains alcohol if soap and water are not available. Do not touch your eyes, nose, or mouth without washing your hands first.  Handwashing           •Cover a sneeze or cough. Use a tissue that covers your mouth and nose. Throw the tissue away in a trash can right away. Use the bend of your arm if a tissue is not available. Wash your hands well with soap and water or use a hand .      •Stay away from others while you are sick. Avoid crowds as much as possible.      •Ask about vaccines you may need. Talk to your healthcare provider about your vaccine history. He or she will tell you which vaccines you need, and when to get them.      Prevent shingles or another shingles outbreak: A vaccine may be given to help prevent shingles. You can get the vaccine even if you already had shingles. The vaccine can help prevent a future outbreak. If you do get shingles again, the vaccine can keep it from becoming severe. The vaccine comes in 2 forms. Your healthcare provider will tell you which form is right for you. The decision is based on your age and any medical conditions you have. A 2-dose vaccine is usually given to adults 50 years or older. A 1-dose vaccine may be given to adults 60 years or older.    Follow up with your doctor as directed: Write down your questions so you remember to ask them during your visits.    For more information:   •Centers for Disease Control and Prevention  [address] [place], [place] [id number]  Phone: [phone]  Phone: [phone]  Web Address: http://www.cdc.gov

## 2021-09-08 NOTE — ED PROVIDER NOTE - CLINICAL SUMMARY MEDICAL DECISION MAKING FREE TEXT BOX
Pt has singles (zoster). Rx Valtrex. Pt is well appearing, has no new complaints and able to walk with normal gait. Pt is stable for discharge and follow up with medical doctor. Pt educated on care and need for follow up. Discussed anticipatory guidance and return precautions. Questions answered. I had a detailed discussion with the patient and/or guardian regarding the historical points, exam findings, and any diagnostic results supporting the discharge diagnosis.

## 2021-09-08 NOTE — ED PROVIDER NOTE - OBJECTIVE STATEMENT
Chief complaint of rash to right thigh area x 4 days. Pt has vesicles on erythematous base in dermatomal pattern to right lateral thigh x 4 days.  No fever, no back pain, no urinary symptoms.

## 2021-09-08 NOTE — ED PROVIDER NOTE - NSFOLLOWUPCLINICS_GEN_ALL_ED_FT
[facility]  Internal Medicine  [address] [place] [address]  Phone: [phone]  Fax: [phone]

## 2021-10-06 ENCOUNTER — LABORATORY RESULT (OUTPATIENT)
Age: 36
End: 2021-10-06

## 2021-10-06 ENCOUNTER — APPOINTMENT (OUTPATIENT)
Dept: INTERNAL MEDICINE | Facility: CLINIC | Age: 36
End: 2021-10-06
Payer: COMMERCIAL

## 2021-10-06 VITALS
DIASTOLIC BLOOD PRESSURE: 84 MMHG | HEART RATE: 83 BPM | TEMPERATURE: 97.9 F | HEIGHT: 65 IN | BODY MASS INDEX: 28.49 KG/M2 | WEIGHT: 171 LBS | SYSTOLIC BLOOD PRESSURE: 123 MMHG | RESPIRATION RATE: 16 BRPM

## 2021-10-06 DIAGNOSIS — Z00.00 ENCOUNTER FOR GENERAL ADULT MEDICAL EXAMINATION W/OUT ABNORMAL FINDINGS: ICD-10-CM

## 2021-10-06 PROCEDURE — 99214 OFFICE O/P EST MOD 30 MIN: CPT

## 2021-10-06 PROCEDURE — 99385 PREV VISIT NEW AGE 18-39: CPT

## 2021-10-06 PROCEDURE — 99204 OFFICE O/P NEW MOD 45 MIN: CPT | Mod: 25

## 2021-10-06 RX ORDER — NITROFURANTOIN MACROCRYSTALS 100 MG/1
100 CAPSULE ORAL
Qty: 14 | Refills: 0 | Status: ACTIVE | COMMUNITY
Start: 2021-10-06 | End: 1900-01-01

## 2021-10-06 NOTE — ASSESSMENT
[FreeTextEntry1] : recommend covid19 vaccination; pt delcine\par \par advised to follow up with \par \par \par ADRENAL MASS: \par ADVISED TO SEE ENDO \par ADIVSED TO DO LABS AS PER ENDO; ORDER GIVEN\par CT ABD PER ENDO \par ADVISED TO SEE ONCO SURGERY \par \par THYROID NODULE\par Labs AND us THYROID\par \par EPIGASTRIC PAIN:\par NO RED FLAG SYMPTOMS\par CHECK STOOL TEST\par WILL DO CT ABD PER ENDO\par \par DYSURIA:\par NO FLANK PAIN OR FEVER RO CHILLS\par SUPRAPUBIC PAIN\par UA/UCX\par START Empirical ABX \par CURRENT NOT BREAST FEEDING OR PREGNANT \par \par \par 3 WEEKS FOLLOW UP ON SITE \par

## 2021-10-06 NOTE — HEALTH RISK ASSESSMENT
[Patient reported PAP Smear was normal] : Patient reported PAP Smear was normal [Yes] : Yes [Monthly or less (1 pt)] : Monthly or less (1 point) [1 or 2 (0 pts)] : 1 or 2 (0 points) [Never (0 pts)] : Never (0 points) [No] : In the past 12 months have you used drugs other than those required for medical reasons? No [1] : 1) Little interest or pleasure doing things for several days (1) [0] : 2) Feeling down, depressed, or hopeless: Not at all (0) [None] : None [Employed] : employed [Feels Safe at Home] : Feels safe at home [Fully functional (bathing, dressing, toileting, transferring, walking, feeding)] : Fully functional (bathing, dressing, toileting, transferring, walking, feeding) [Fully functional (using the telephone, shopping, preparing meals, housekeeping, doing laundry, using] : Fully functional and needs no help or supervision to perform IADLs (using the telephone, shopping, preparing meals, housekeeping, doing laundry, using transportation, managing medications and managing finances) [] : No [[id number]] : 1 [Change in mental status noted] : No change in mental status noted [Language] : denies difficulty with language [Behavior] : denies difficulty with behavior [Learning/Retaining New Information] : denies difficulty learning/retaining new information [Handling Complex Tasks] : denies difficulty handling complex tasks [Reasoning] : denies difficulty with reasoning [Spatial Ability and Orientation] : denies difficulty with spatial ability and orientation [Sexually Active] : not sexually active [High Risk Behavior] : no high risk behavior [PapSmearDate] : 10/2020 [FreeTextEntry2] :

## 2021-10-06 NOTE — HISTORY OF PRESENT ILLNESS
[de-identified] :  35 year old female being seen for a follow-up visit for adrenal evaluation/adrenal disorder and adrenal mass, hirsutism, PCOS for establish care \par \par \par pt was in ED due to shingles in 09/2021; \par now no pain; or rashes but some scar of Right anterior thigh \par \par had epipastic pain for 1 week; no eating outside; no diarrhea/ constipation; not associate with food; intermittent nausea; no melean or blood in stool; no fhx of cancer hx; \par \par saw endo since 2019 for adrenal mass; was advised to do labs and do CT abd and see onco surgery but has not follow up sinec 2019';\par \par has not follow up with endo sicne 12/2020; stated that she lost her paper \par \par \par DYSURIA For 2 DAYS; NO Urgency; NO HEMATURIA ; NO FEVER OR CHILLS OR FLANK PAIN \par \par ROS\par \par Constitutional:  no fever and no chills. \par Eyes:  no discharge and no pain. \par HEENT:  no earache and no hearing loss. \par Cardiovascular:  no chest pain, no palpitations and no leg claudication. \par Respiratory:  no shortness of breath, no wheezing and no cough. \par Gastrointestinal:  no abdominal pain, no nausea and no constipation. \par Genitourinary:  no dysuria and no incontinence. \par Musculoskeletal:  no joint pain, no joint stiffness and no joint swelling. \par Integumentary:  no itching and no mole changes. \par Neurological:  no headache, no dizziness and no fainting. \par Psychiatric:  not suicidal, no insomnia, no anxiety and no depression. \par \par Physical Exam\par \par Constitutional:   no acute distress, well nourished, well developed and well-appearing. \par Eyes:  normal sclera/conjunctiva, pupils equal round and reactive to light and extraocular movements intact. \par ENT:  the outer ears and nose were normal in appearance and the oropharynx was normal. \par Neck:  supple, no lymphadenopathy and the thyroid was normal and there were no nodules present. \par Pulmonary:  no respiratory distress, lungs were clear to auscultation bilaterally, no accessory muscle use. \par Cardiac:  normal rate, with a regular rhythm, normal S1 and S2 and no murmur heard. \par Vascular:  there was no peripheral edema. \par Abdomen:  abdomen soft, non-tender, non-distended, no abdominal mass palpated, no HSM and normal bowel sounds. \par Lymphatic:  no posterior cervical lymphadenopathy, no anterior cervical lymphadenopathy. \par Back:  no CVA tenderness and no spinal tenderness. \par Musculoskeletal: no joint swelling and grossly normal strength/tone. \par Skin:  no rash. \par Neurology:  normal gait, coordination grossly intact, no focal deficits and deep tendon reflexes were 2+ and symmetric. \par Psychiatric:  the affect was normal, oriented to person, place, and time and insight and judgment were intact.\par

## 2021-11-03 ENCOUNTER — APPOINTMENT (OUTPATIENT)
Dept: INTERNAL MEDICINE | Facility: CLINIC | Age: 36
End: 2021-11-03
Payer: COMMERCIAL

## 2021-11-03 ENCOUNTER — LABORATORY RESULT (OUTPATIENT)
Age: 36
End: 2021-11-03

## 2021-11-03 ENCOUNTER — APPOINTMENT (OUTPATIENT)
Dept: ENDOCRINOLOGY | Facility: CLINIC | Age: 36
End: 2021-11-03
Payer: COMMERCIAL

## 2021-11-03 VITALS
RESPIRATION RATE: 16 BRPM | TEMPERATURE: 98.3 F | HEIGHT: 65 IN | SYSTOLIC BLOOD PRESSURE: 130 MMHG | WEIGHT: 177 LBS | OXYGEN SATURATION: 100 % | HEART RATE: 98 BPM | BODY MASS INDEX: 29.49 KG/M2 | DIASTOLIC BLOOD PRESSURE: 81 MMHG

## 2021-11-03 DIAGNOSIS — R30.0 DYSURIA: ICD-10-CM

## 2021-11-03 DIAGNOSIS — E78.00 PURE HYPERCHOLESTEROLEMIA, UNSPECIFIED: ICD-10-CM

## 2021-11-03 LAB
ALBUMIN SERPL ELPH-MCNC: 4.7 G/DL
ALP BLD-CCNC: 113 U/L
ALT SERPL-CCNC: 15 U/L
AMYLASE/CREAT SERPL: 40 U/L
ANION GAP SERPL CALC-SCNC: 14 MMOL/L
APPEARANCE: CLEAR
AST SERPL-CCNC: 14 U/L
BASOPHILS # BLD AUTO: 0.06 K/UL
BASOPHILS NFR BLD AUTO: 0.7 %
BILIRUB SERPL-MCNC: 0.3 MG/DL
BILIRUBIN URINE: NEGATIVE
BLOOD URINE: NEGATIVE
BUN SERPL-MCNC: 8 MG/DL
CALCIUM SERPL-MCNC: 9.8 MG/DL
CHLORIDE SERPL-SCNC: 101 MMOL/L
CHOLEST SERPL-MCNC: 185 MG/DL
CO2 SERPL-SCNC: 25 MMOL/L
COLOR: YELLOW
CREAT SERPL-MCNC: 0.71 MG/DL
EOSINOPHIL # BLD AUTO: 0.21 K/UL
EOSINOPHIL NFR BLD AUTO: 2.3 %
FERRITIN SERPL-MCNC: 15 NG/ML
FOLATE RBC-MCNC: 816 NG/ML
GLUCOSE QUALITATIVE U: NEGATIVE
GLUCOSE SERPL-MCNC: 65 MG/DL
HBV SURFACE AB SER QL: NONREACTIVE
HBV SURFACE AG SER QL: NONREACTIVE
HCT VFR BLD CALC: 35.9 %
HCT VFR BLD CALC: 36.4 %
HCV AB SER QL: NONREACTIVE
HCV S/CO RATIO: 0.2 S/CO
HDLC SERPL-MCNC: 40 MG/DL
HGB BLD-MCNC: 10.8 G/DL
IMM GRANULOCYTES NFR BLD AUTO: 0.3 %
IRON SATN MFR SERPL: 13 %
IRON SERPL-MCNC: 55 UG/DL
KETONES URINE: NEGATIVE
LDLC SERPL CALC-MCNC: 112 MG/DL
LEUKOCYTE ESTERASE URINE: NEGATIVE
LPL SERPL-CCNC: 33 U/L
LYMPHOCYTES # BLD AUTO: 2.74 K/UL
LYMPHOCYTES NFR BLD AUTO: 30.2 %
MAN DIFF?: NORMAL
MCHC RBC-ENTMCNC: 23.8 PG
MCHC RBC-ENTMCNC: 30.1 GM/DL
MCV RBC AUTO: 79.2 FL
MONOCYTES # BLD AUTO: 0.54 K/UL
MONOCYTES NFR BLD AUTO: 6 %
NEUTROPHILS # BLD AUTO: 5.49 K/UL
NEUTROPHILS NFR BLD AUTO: 60.5 %
NITRITE URINE: NEGATIVE
NONHDLC SERPL-MCNC: 145 MG/DL
PH URINE: 6
PLATELET # BLD AUTO: 449 K/UL
POTASSIUM SERPL-SCNC: 4.6 MMOL/L
PROT SERPL-MCNC: 7.8 G/DL
PROTEIN URINE: NORMAL
RBC # BLD: 4.53 M/UL
RBC # FLD: 16.1 %
SODIUM SERPL-SCNC: 140 MMOL/L
SPECIFIC GRAVITY URINE: 1.02
TIBC SERPL-MCNC: 432 UG/DL
TRANSFERRIN SERPL-MCNC: 375 MG/DL
TRIGL SERPL-MCNC: 167 MG/DL
UIBC SERPL-MCNC: 377 UG/DL
UROBILINOGEN URINE: NORMAL
VIT B12 SERPL-MCNC: 587 PG/ML
WBC # FLD AUTO: 9.07 K/UL

## 2021-11-03 PROCEDURE — 99214 OFFICE O/P EST MOD 30 MIN: CPT

## 2021-11-03 PROCEDURE — 99213 OFFICE O/P EST LOW 20 MIN: CPT

## 2021-11-03 NOTE — HISTORY OF PRESENT ILLNESS
[FreeTextEntry1] : The patient feels well, she is gaining weight. She run out of medications about 1 month ago. The hirsutism still present. her periods are irregular. The last CT scan of the adrenal was 2019. So far the endocrine work up to determine if the lesion is functional, has been negative. The Overnight Dexamethasone suppression test was negative, the 24 hours urine for fractionated catecholamines was normal. The Aldosterone/PRA ratio was fine.

## 2021-11-03 NOTE — REASON FOR VISIT
[Follow - Up] : a follow-up visit [Thyroid nodule/ MNG] : thyroid nodule/ MNG [Weight Management/Obesity] : weight management/obesity [PCOS] : PCOS

## 2021-11-03 NOTE — HEALTH RISK ASSESSMENT
[Yes] : Yes [Monthly or less (1 pt)] : Monthly or less (1 point) [1 or 2 (0 pts)] : 1 or 2 (0 points) [Never (0 pts)] : Never (0 points) [No] : In the past 12 months have you used drugs other than those required for medical reasons? No [1] : 1) Little interest or pleasure doing things for several days (1) [0] : 2) Feeling down, depressed, or hopeless: Not at all (0) [Patient reported PAP Smear was normal] : Patient reported PAP Smear was normal [None] : None [Employed] : employed [Feels Safe at Home] : Feels safe at home [Fully functional (bathing, dressing, toileting, transferring, walking, feeding)] : Fully functional (bathing, dressing, toileting, transferring, walking, feeding) [Fully functional (using the telephone, shopping, preparing meals, housekeeping, doing laundry, using] : Fully functional and needs no help or supervision to perform IADLs (using the telephone, shopping, preparing meals, housekeeping, doing laundry, using transportation, managing medications and managing finances) [] : No [[id number]] : 1 [Change in mental status noted] : No change in mental status noted [Language] : denies difficulty with language [Behavior] : denies difficulty with behavior [Learning/Retaining New Information] : denies difficulty learning/retaining new information [Handling Complex Tasks] : denies difficulty handling complex tasks [Reasoning] : denies difficulty with reasoning [Spatial Ability and Orientation] : denies difficulty with spatial ability and orientation [Sexually Active] : not sexually active [High Risk Behavior] : no high risk behavior [PapSmearDate] : 10/2020 [FreeTextEntry2] :

## 2021-11-03 NOTE — ASSESSMENT
[FreeTextEntry1] : The patient was unable to make an appointment with Dr. Lewis for the adrenal lesion\par So far the work up done to rule out a functional adenoma has been negative\par Will order a new CT scan of the adrenal glands\par Patient has hirsutism with elevated free testosterone and DHEA-S\par She is unable to lose weight\par She run out of medications\par Will renew the  Metformin and Spironolactone\par Will repeat the blood tests before next visi

## 2021-11-03 NOTE — HISTORY OF PRESENT ILLNESS
[de-identified] :  35 year old female being seen for a follow-up visit for adrenal evaluation/adrenal disorder and adrenal mass, hirsutism, PCOS for FOLLOW UP\par \par \par pt was in ED due to shingles in 09/2021; \par now no pain; or rashes but some scar of Right anterior thigh \par \par had epipastic pain for 1 week; no eating outside; no diarrhea/ constipation; not associate with food; intermittent nausea; no melean or blood in stool; no fhx of cancer hx; \par \par saw endo since 2019 for adrenal mass; was advised to do labs and do CT abd and see onco surgery but has not follow up [place] 2019';\par \par has not follow up with endo sicne 12/2020; stated that she lost her paper \par miss today's endo appointment \par \par \par DYSURIA For 2 DAYS; NO Urgency; NO HEMATURIA ; NO FEVER OR CHILLS OR FLANK PAIN \par S/P ABX \par \par ROS\par \par Constitutional:  no fever and no chills. \par Eyes:  no discharge and no pain. \par HEENT:  no earache and no hearing loss. \par Cardiovascular:  no chest pain, no palpitations and no leg claudication. \par Respiratory:  no shortness of breath, no wheezing and no cough. \par Gastrointestinal:  no abdominal pain, no nausea and no constipation. \par Genitourinary:  no dysuria and no incontinence. \par Musculoskeletal:  no joint pain, no joint stiffness and no joint swelling. \par Integumentary:  no itching and no mole changes. \par Neurological:  no headache, no dizziness and no fainting. \par Psychiatric:  not suicidal, no insomnia, no anxiety and no depression. \par \par Physical Exam\par \par Constitutional:   no acute distress, well nourished, well developed and well-appearing. \par Eyes:  normal sclera/conjunctiva, pupils equal round and reactive to light and extraocular movements intact. \par ENT:  the outer ears and nose were normal in appearance and the oropharynx was normal. \par Neck:  supple, no lymphadenopathy and the thyroid was normal and there were no nodules present. \par Pulmonary:  no respiratory distress, lungs were clear to auscultation bilaterally, no accessory muscle use. \par Cardiac:  normal rate, with a regular rhythm, normal S1 and S2 and no murmur heard. \par Vascular:  there was no peripheral edema. \par Abdomen:  abdomen soft, non-tender, non-distended, no abdominal mass palpated, no HSM and normal bowel sounds. \par Lymphatic:  no posterior cervical lymphadenopathy, no anterior cervical lymphadenopathy. \par Back:  no CVA tenderness and no spinal tenderness. \par Musculoskeletal: no joint swelling and grossly normal strength/tone. \par Skin:  no rash. \par Neurology:  normal gait, coordination grossly intact, no focal deficits and deep tendon reflexes were 2+ and symmetric. \par Psychiatric:  the affect was normal, oriented to person, place, and time and insight and judgment were intact.\par

## 2021-11-03 NOTE — DATA REVIEWED
[FreeTextEntry1] : The DHEA-S is elevated, the testosterone is also slightly elevated. The last CT scan of the adrenal was doen on 7/19

## 2021-11-03 NOTE — ASSESSMENT
[FreeTextEntry1] : recommend covid19 vaccination; pt delcine\par \par advised to follow up with \par \par \par ADRENAL MASS: \par ADVISED TO SEE ENDO \par Advised TO DO LABS AS PER ENDO; ORDER GIVEN\par CT ABD PER ENDO \par ADVISED TO SEE ONCO SURGERY \par \par THYROID NODULE\par Labs AND us THYROID\par \par EPIGASTRIC PAIN:\par NO RED FLAG SYMPTOMS\par ahs not do STOOL TEST as ordered \par WILL DO CT ABD PER ENDO\par \par DYSURIA:\par NO FLANK PAIN OR FEVER RO CHILLS\par SUPRAPUBIC PAIN\par UA/UCX\par s/p Empirical ABX \par still has some increase frequency; \par CURRENT NOT BREAST FEEDING OR PREGNANT \par advised to see her urogyn for urinary incontinent \par \par \par HLD: \par HEALTHY DEIT AN EXERCISE; \par 6 MONTHS FOLLOW UP \par \par 1 months follow up on TTM\par \par 2 months follow up on site \par

## 2021-11-04 ENCOUNTER — LABORATORY RESULT (OUTPATIENT)
Age: 36
End: 2021-11-04

## 2021-11-09 ENCOUNTER — APPOINTMENT (OUTPATIENT)
Dept: SURGICAL ONCOLOGY | Facility: CLINIC | Age: 36
End: 2021-11-09

## 2021-11-10 ENCOUNTER — APPOINTMENT (OUTPATIENT)
Dept: INTERNAL MEDICINE | Facility: CLINIC | Age: 36
End: 2021-11-10
Payer: COMMERCIAL

## 2021-11-10 LAB
APPEARANCE: CLEAR
BILIRUBIN URINE: NEGATIVE
BLOOD URINE: NEGATIVE
COLOR: YELLOW
GLUCOSE QUALITATIVE U: NEGATIVE
H PYLORI AG STL QL: NOT DETECTED
HEMOCCULT STL QL: NEGATIVE
KETONES URINE: NEGATIVE
LEUKOCYTE ESTERASE URINE: ABNORMAL
NITRITE URINE: NEGATIVE
PH URINE: 7
PROTEIN URINE: NORMAL
SPECIFIC GRAVITY URINE: 1.02
UROBILINOGEN URINE: NORMAL

## 2021-11-10 PROCEDURE — 99442: CPT

## 2021-11-10 NOTE — ASSESSMENT
[FreeTextEntry1] : recommend covid19 vaccination; pt delcine\par \par advised to follow up with \par \par \par ADRENAL MASS: \par ADVISED TO SEE ENDO \par Advised TO DO LABS AS PER ENDO; ORDER GIVEN\par CT ABD PER ENDO \par ADVISED TO SEE ONCO SURGERY \par \par THYROID NODULE\par Labs AND us THYROID\par \par EPIGASTRIC PAIN:\par NO RED FLAG SYMPTOMS\par STOOL TEST WNL \par WILL DO CT ABD PER ENDO\par PPI for now \par \par DYSURIA:\par NO FLANK PAIN OR FEVER RO CHILLS\par SUPRAPUBIC PAIN\par UA/UCX\par s/p Empirical ABX \par still has some increase frequency; \par CURRENT NOT BREAST FEEDING OR PREGNANT \par repeat UA grossly wnl\par advised to see her urogyn for urinary incontinent \par \par \par HLD: \par HEALTHY DEIT AN EXERCISE; \par 6 MONTHS FOLLOW UP \par \par \par \par 2 months follow up on site \par \par I spend a total of 15 minutes on the date of the encounter evaluating and treating patient\par \par

## 2021-11-10 NOTE — HEALTH RISK ASSESSMENT
[Yes] : Yes [Monthly or less (1 pt)] : Monthly or less (1 point) [1 or 2 (0 pts)] : 1 or 2 (0 points) [Never (0 pts)] : Never (0 points) [No] : In the past 12 months have you used drugs other than those required for medical reasons? No [1] : 1) Little interest or pleasure doing things for several days (1) [0] : 2) Feeling down, depressed, or hopeless: Not at all (0) [Patient reported PAP Smear was normal] : Patient reported PAP Smear was normal [None] : None [Employed] : employed [Feels Safe at Home] : Feels safe at home [Fully functional (bathing, dressing, toileting, transferring, walking, feeding)] : Fully functional (bathing, dressing, toileting, transferring, walking, feeding) [Fully functional (using the telephone, shopping, preparing meals, housekeeping, doing laundry, using] : Fully functional and needs no help or supervision to perform IADLs (using the telephone, shopping, preparing meals, housekeeping, doing laundry, using transportation, managing medications and managing finances) [] : No [[id number]] : 1 [Change in mental status noted] : No change in mental status noted [Language] : denies difficulty with language [Behavior] : denies difficulty with behavior [Learning/Retaining New Information] : denies difficulty learning/retaining new information [Handling Complex Tasks] : denies difficulty handling complex tasks [Reasoning] : denies difficulty with reasoning [Spatial Ability and Orientation] : denies difficulty with spatial ability and orientation [Sexually Active] : not sexually active [High Risk Behavior] : no high risk behavior [PapSmearDate] : 10/2020 [FreeTextEntry2] :

## 2021-11-10 NOTE — HISTORY OF PRESENT ILLNESS
[de-identified] : This visit was provided via TELEPHONE. The patient, BRIDGET REYES , was located at home,[address] [place] [address]\[place] [address] , at the time of the visit. \par The provider,TIAN CORCORAN , was located at his medical office located in  at the time of the visit. The patient, and Provider participated in the TELEPHONE\par Verbal consent given on Nov 10 2021  5:30PM by the patient.\par \par \par  35 year old female being seen for a follow-up visit for adrenal evaluation/adrenal disorder and adrenal mass, hirsutism, PCOS for FOLLOW UP\par \par \par pt was in ED due to shingles in 09/2021; \par now no pain; or rashes but some scar of Right anterior thigh \par \par had epipastic pain for 1 week; no eating outside; no diarrhea/ constipation; not associate with food; intermittent nausea; no melean or blood in stool; no fhx of cancer hx; ;\par \par saw endo since 2019 for adrenal mass; was advised to do labs and do CT abd and see onco surgery but has not follow up sine 2019';\par \par has not follow up with endo kaitlin 12/2020; stated that she lost her paper \par miss today's endo appointment \par \par

## 2021-11-12 ENCOUNTER — APPOINTMENT (OUTPATIENT)
Dept: CT IMAGING | Facility: HOSPITAL | Age: 36
End: 2021-11-12
Payer: MEDICAID

## 2021-11-12 DIAGNOSIS — Z11.59 ENCOUNTER FOR SCREENING FOR OTHER VIRAL DISEASES: ICD-10-CM

## 2021-12-09 RX ORDER — OXYBUTYNIN CHLORIDE 10 MG/1
10 TABLET, EXTENDED RELEASE ORAL
Qty: 30 | Refills: 6 | Status: ACTIVE | COMMUNITY
Start: 2017-11-20 | End: 1900-01-01

## 2022-01-11 ENCOUNTER — LABORATORY RESULT (OUTPATIENT)
Age: 37
End: 2022-01-11

## 2022-01-12 ENCOUNTER — APPOINTMENT (OUTPATIENT)
Dept: INTERNAL MEDICINE | Facility: CLINIC | Age: 37
End: 2022-01-12
Payer: MEDICAID

## 2022-01-12 ENCOUNTER — APPOINTMENT (OUTPATIENT)
Dept: OBGYN | Facility: CLINIC | Age: 37
End: 2022-01-12
Payer: MEDICAID

## 2022-01-12 VITALS — SYSTOLIC BLOOD PRESSURE: 155 MMHG | BODY MASS INDEX: 29.95 KG/M2 | WEIGHT: 180 LBS | DIASTOLIC BLOOD PRESSURE: 93 MMHG

## 2022-01-12 DIAGNOSIS — N81.6 RECTOCELE: ICD-10-CM

## 2022-01-12 DIAGNOSIS — R32 UNSPECIFIED URINARY INCONTINENCE: ICD-10-CM

## 2022-01-12 DIAGNOSIS — U07.1 COVID-19: ICD-10-CM

## 2022-01-12 PROCEDURE — 99442: CPT

## 2022-01-12 PROCEDURE — 99395 PREV VISIT EST AGE 18-39: CPT

## 2022-01-12 RX ORDER — BENZONATATE 100 MG/1
100 CAPSULE ORAL 3 TIMES DAILY
Qty: 21 | Refills: 0 | Status: ACTIVE | COMMUNITY
Start: 2022-01-12 | End: 1900-01-01

## 2022-01-12 RX ORDER — GUAIFENESIN AND DEXTROMETHORPHAN 10; 100 MG/5ML; MG/5ML
10-100 SYRUP ORAL
Qty: 1 | Refills: 0 | Status: ACTIVE | COMMUNITY
Start: 2022-01-12 | End: 1900-01-01

## 2022-01-12 RX ORDER — FLUTICASONE PROPIONATE 50 UG/1
50 SPRAY, METERED NASAL DAILY
Qty: 1 | Refills: 1 | Status: ACTIVE | COMMUNITY
Start: 2022-01-12 | End: 1900-01-01

## 2022-01-12 NOTE — ASSESSMENT
[FreeTextEntry1] : recommend covid19 vaccination; pt delcine\par \par advised to follow up with \par \par \par ADRENAL MASS: \par ADVISED TO SEE ENDO \par Advised TO DO LABS AS PER ENDO; ORDER GIVEN\par CT ABD PER ENDO again\par ADVISED TO SEE ONCO SURGERY again\par \par THYROID NODULE\par Labs AND us THYROID again \par \par EPIGASTRIC PAIN:\par NO RED FLAG SYMPTOMS\par STOOL TEST WNL \par WILL DO CT ABD PER ENDO\par PPI for now \par \par DYSURIA:\par NO FLANK PAIN OR FEVER RO CHILLS\par SUPRAPUBIC PAIN\par UA/UCX\par s/p Empirical ABX \par still has some increase frequency; \par CURRENT NOT BREAST FEEDING OR PREGNANT \par repeat UA grossly wnl\par advised to see her urogyn for urinary incontinent again \par \par \par HLD: \par HEALTHY DEIT AN EXERCISE; \par 6 MONTHS FOLLOW UP \par \par covid19 infection: \par meds sent for cough\par exam limited due to TTM; but no resp distress noticed or reported \par follow up as needed\par discussed again regarding covid19 vaccination pt still refuse \par \par 6 months follow up\par \par \par I spend a total of 15 minutes on the date of the encounter evaluating and treating patient\par \par \par \par

## 2022-01-12 NOTE — PHYSICAL EXAM
[Appropriately responsive] : appropriately responsive [Alert] : alert [No Acute Distress] : no acute distress [No Lymphadenopathy] : no lymphadenopathy [Regular Rate Rhythm] : regular rate rhythm [No Murmurs] : no murmurs [Clear to Auscultation B/L] : clear to auscultation bilaterally [Soft] : soft [Non-tender] : non-tender [Non-distended] : non-distended [No HSM] : No HSM [No Lesions] : no lesions [No Mass] : no mass [Oriented x3] : oriented x3 [Examination Of The Breasts] : a normal appearance [No Masses] : no breast masses were palpable [Labia Majora] : normal [Normal] : normal [Cystocele] : a cystocele [Rectocele] : a rectocele [FreeTextEntry4] : 2nd degree rectocele / cystocele

## 2022-01-12 NOTE — HISTORY OF PRESENT ILLNESS
[de-identified] : This visit was provided via TELEPHONE. The patient, BRIDGET REYES , was located at home,[address] [place] [address]\[place] [address] , at the time of the visit. \par The provider,TIAN CORCORAN , was located at his medical office located in  at the time of the visit. The patient, and Provider participated in the TELEPHONE\par Verbal consent given on Nov 12 2021 12:00PM by the patient.\par \par \par  35 year old female being seen for a follow-up visit for adrenal evaluation/adrenal disorder and adrenal mass, hirsutism, PCOS for FOLLOW UP\par \par \par pt was in ED due to shingles in 09/2021; \par now no pain; or rashes but some scar of Right anterior thigh \par \par had epipastic pain for 1 week; no eating outside; no diarrhea/ constipation; not associate with food; intermittent nausea; no melean or blood in stool; no fhx of cancer hx; ;\par \par saw endo since 2019 for adrenal mass; was advised to do labs and do CT abd and see onco surgery but has not follow up sine 2019';\par \par pt saw endo and has not do labs or CT or follow up with onco surgery cited that she lost her insurance \par \par \par reported that she has covid19 again in 01/2022; now no sob; feeling better; but still some cough and nasal discharge;\par again refuse covid19 vaccination  \par

## 2022-01-12 NOTE — HISTORY OF PRESENT ILLNESS
[FreeTextEntry1] : pt comes for pa . she has incontinence . The excercises have not worked . She has stress incontinence not urge incontinence . Only with laughter , cough and sneezing .

## 2022-01-12 NOTE — HEALTH RISK ASSESSMENT
[[id number]] : 1 [Change in mental status noted] : No change in mental status noted [Language] : denies difficulty with language [Behavior] : denies difficulty with behavior [Learning/Retaining New Information] : denies difficulty learning/retaining new information [Handling Complex Tasks] : denies difficulty handling complex tasks [Reasoning] : denies difficulty with reasoning [Spatial Ability and Orientation] : denies difficulty with spatial ability and orientation [Sexually Active] : not sexually active [High Risk Behavior] : no high risk behavior [PapSmearDate] : 10/2020 [FreeTextEntry2] :

## 2022-01-14 ENCOUNTER — NON-APPOINTMENT (OUTPATIENT)
Age: 37
End: 2022-01-14

## 2022-01-14 LAB
ALBUMIN SERPL ELPH-MCNC: 4.5 G/DL
ALP BLD-CCNC: 122 U/L
ALT SERPL-CCNC: 17 U/L
ANION GAP SERPL CALC-SCNC: 13 MMOL/L
AST SERPL-CCNC: 17 U/L
BILIRUB SERPL-MCNC: 0.3 MG/DL
BUN SERPL-MCNC: 6 MG/DL
CALCIUM SERPL-MCNC: 10.2 MG/DL
CHLORIDE SERPL-SCNC: 100 MMOL/L
CO2 SERPL-SCNC: 24 MMOL/L
COVID-19 NUCLEOCAPSID  GAM ANTIBODY INTERPRETATION: POSITIVE
CREAT SERPL-MCNC: 0.77 MG/DL
GLUCOSE SERPL-MCNC: 88 MG/DL
POTASSIUM SERPL-SCNC: 5.1 MMOL/L
PROT SERPL-MCNC: 7.7 G/DL
SARS-COV-2 AB SERPL QL IA: 33.8 INDEX
SODIUM SERPL-SCNC: 137 MMOL/L

## 2022-01-17 LAB
ACTH SER-ACNC: 11.3 PG/ML
ALDOSTERONE SERUM: 32.4 NG/DL
CORTIS SERPL-MCNC: 7.2 UG/DL
DHEA-S SERPL-MCNC: 245 UG/DL
ESTRADIOL SERPL-MCNC: 200 PG/ML
FSH SERPL-MCNC: 3.1 IU/L
GLUCOSE BS SERPL-MCNC: 81 MG/DL
LH SERPL-ACNC: 10.6 IU/L
PROLACTIN SERPL-MCNC: 15 NG/ML
T4 FREE SERPL-MCNC: 1.2 NG/DL
TESTOST FREE SERPL-MCNC: 2 PG/ML
TESTOST SERPL-MCNC: 23.3 NG/DL
TSH SERPL-ACNC: 1.32 UIU/ML

## 2022-01-20 ENCOUNTER — APPOINTMENT (OUTPATIENT)
Dept: ENDOCRINOLOGY | Facility: CLINIC | Age: 37
End: 2022-01-20
Payer: MEDICAID

## 2022-01-20 DIAGNOSIS — Z86.39 PERSONAL HISTORY OF OTHER ENDOCRINE, NUTRITIONAL AND METABOLIC DISEASE: ICD-10-CM

## 2022-01-20 DIAGNOSIS — E28.2 POLYCYSTIC OVARIAN SYNDROME: ICD-10-CM

## 2022-01-20 DIAGNOSIS — E66.9 OBESITY, UNSPECIFIED: ICD-10-CM

## 2022-01-20 DIAGNOSIS — E28.8 OTHER OVARIAN DYSFUNCTION: ICD-10-CM

## 2022-01-20 DIAGNOSIS — L68.0 HIRSUTISM: ICD-10-CM

## 2022-01-20 LAB — RENIN ACTIVITY, PLASMA: 3.5 NG/ML/HR

## 2022-01-20 PROCEDURE — 99443: CPT

## 2022-01-20 RX ORDER — SPIRONOLACTONE 25 MG/1
25 TABLET ORAL TWICE DAILY
Qty: 180 | Refills: 3 | Status: ACTIVE | COMMUNITY
Start: 2019-10-31 | End: 1900-01-01

## 2022-01-20 NOTE — HISTORY OF PRESENT ILLNESS
Per provider, blood work is to be held until Covid test results.   [FreeTextEntry1] : Patient is doing well, not gaining weight. Her periods are regular but heavy.  Advised to see the GYN doctor. Denies any abdominal pain. No headaches. She has been scheduled for a CT scan of the adrenal gland. She will see Dr. Lewis after wards.

## 2022-01-20 NOTE — ASSESSMENT
[FreeTextEntry1] : The patient is doing well\par The hirsutism has improved\par The androgen levels are fine\par Advised to see her GYN for the heavy periods\par She will call me back for the CT scan of the adrenal result\par Will continue the same treatment

## 2022-01-20 NOTE — DATA REVIEWED
[FreeTextEntry1] : The TSH and free t4 were normal. The Free and total testosterone were fine. The FSH and LH were also fine. The Aldosterone PRA ratio was fine.

## 2022-01-22 NOTE — ED PROVIDER NOTE - CROS ED [facility] ALL NEG
PRINCIPAL DISCHARGE DIAGNOSIS  Diagnosis: Malignant neoplasm of female breast  Assessment and Plan of Treatment:       
- - -

## 2022-01-25 ENCOUNTER — RESULT REVIEW (OUTPATIENT)
Age: 37
End: 2022-01-25

## 2022-01-25 ENCOUNTER — APPOINTMENT (OUTPATIENT)
Dept: ULTRASOUND IMAGING | Facility: HOSPITAL | Age: 37
End: 2022-01-25

## 2022-01-25 ENCOUNTER — APPOINTMENT (OUTPATIENT)
Dept: CT IMAGING | Facility: HOSPITAL | Age: 37
End: 2022-01-25

## 2022-01-25 ENCOUNTER — OUTPATIENT (OUTPATIENT)
Dept: OUTPATIENT SERVICES | Facility: HOSPITAL | Age: 37
LOS: 1 days | End: 2022-01-25
Payer: MEDICAID

## 2022-01-25 DIAGNOSIS — Z98.890 OTHER SPECIFIED POSTPROCEDURAL STATES: Chronic | ICD-10-CM

## 2022-01-25 DIAGNOSIS — Z86.39 PERSONAL HISTORY OF OTHER ENDOCRINE, NUTRITIONAL AND METABOLIC DISEASE: ICD-10-CM

## 2022-01-25 DIAGNOSIS — E27.8 OTHER SPECIFIED DISORDERS OF ADRENAL GLAND: ICD-10-CM

## 2022-01-25 LAB — HCG UR QL: NEGATIVE — SIGNIFICANT CHANGE UP

## 2022-01-25 PROCEDURE — 76536 US EXAM OF HEAD AND NECK: CPT

## 2022-01-25 PROCEDURE — 76536 US EXAM OF HEAD AND NECK: CPT | Mod: 26

## 2022-01-25 PROCEDURE — 74170 CT ABD WO CNTRST FLWD CNTRST: CPT | Mod: 26

## 2022-01-25 PROCEDURE — 81025 URINE PREGNANCY TEST: CPT

## 2022-01-25 PROCEDURE — 74170 CT ABD WO CNTRST FLWD CNTRST: CPT

## 2022-02-01 ENCOUNTER — APPOINTMENT (OUTPATIENT)
Dept: SURGICAL ONCOLOGY | Facility: CLINIC | Age: 37
End: 2022-02-01
Payer: MEDICAID

## 2022-02-01 VITALS
BODY MASS INDEX: 29.99 KG/M2 | DIASTOLIC BLOOD PRESSURE: 99 MMHG | WEIGHT: 180 LBS | OXYGEN SATURATION: 100 % | SYSTOLIC BLOOD PRESSURE: 154 MMHG | HEIGHT: 65 IN | HEART RATE: 96 BPM

## 2022-02-01 VITALS — TEMPERATURE: 97.1 F

## 2022-02-01 PROCEDURE — 99205 OFFICE O/P NEW HI 60 MIN: CPT

## 2022-02-01 NOTE — ASSESSMENT
[FreeTextEntry1] : IMP:34 y/o female with a left adrenal mass ( probable myelolipoma )\par \par PLAN: \par -MRI with adrenal protocol to definitively characterize the left adrenal mas as a myeloliopma\par -Hirsutism evaluation to continue with Dr. Haas

## 2022-02-01 NOTE — CONSULT LETTER
[Dear  ___] : Dear  [unfilled], [Consult Letter:] : I had the pleasure of evaluating your patient, [unfilled]. [Please see my note below.] : Please see my note below. [Consult Closing:] : Thank you very much for allowing me to participate in the care of this patient.  If you have any questions, please do not hesitate to contact me. [Sincerely,] : Sincerely, [FreeTextEntry1] : I will keep you informed of the MRI results. [FreeTextEntry3] : Sudheer Lewis MD FACS\par Chief of Surgical Oncology\par \par  [DrXi  ___] : Dr. BERRIOS [DrXi ___] : Dr. BERRIOS

## 2022-02-01 NOTE — PHYSICAL EXAM
[Normal] : supple, no neck mass and thyroid not enlarged [Normal Supraclavicular Lymph Nodes] : normal supraclavicular lymph nodes [Normal Groin Lymph Nodes] : normal groin lymph nodes [Normal] : oriented to person, place and time, with appropriate affect [de-identified] : No abdominal tenderness or masses

## 2022-02-01 NOTE — HISTORY OF PRESENT ILLNESS
[de-identified] : Denisse is a 35 year old female who presents today for an initial consultation. She was referred by Huan Haas MD.\par She has been worked up for hursutism and inablity to lose weight.\par To date that work-up has been negative.\par \par CT Abdomen/Pelvis 7/8/19: No acute intra-abdominal findings to suggest a cause for patient's symptoms. Multiple bilateral nonobstructing renal calculi, no hydronephrosis. 4.1 cm predominantly fat attenuation lesion arising from the LEFT adrenal \par gland, compatible with a myelolipoma. IUD within the uterus.\par \par CT abdomen 1/25/22: Stable 4.1 x 3.4 cm mostly fatty attenuation lesion in the left adrenal compatible with a myelolipoma. \par \par US thyroid 1/25/22: Normal size thyroid. Tiny left lobe nodule without suspicious features. \par \par She has a past medical history of obesity, PCOS, thyroid nodule, hirtuism, adrenal mass, HLD, and hyperandrogenism. \par \par \par Referring MD: Huan Haas MD

## 2022-02-03 ENCOUNTER — APPOINTMENT (OUTPATIENT)
Dept: OBGYN | Facility: CLINIC | Age: 37
End: 2022-02-03

## 2022-02-04 ENCOUNTER — APPOINTMENT (OUTPATIENT)
Dept: INTERNAL MEDICINE | Facility: CLINIC | Age: 37
End: 2022-02-04
Payer: MEDICAID

## 2022-02-04 VITALS
BODY MASS INDEX: 30.66 KG/M2 | RESPIRATION RATE: 16 BRPM | HEIGHT: 65 IN | SYSTOLIC BLOOD PRESSURE: 156 MMHG | HEART RATE: 99 BPM | WEIGHT: 184 LBS | OXYGEN SATURATION: 99 % | DIASTOLIC BLOOD PRESSURE: 96 MMHG | TEMPERATURE: 98.2 F

## 2022-02-04 VITALS — SYSTOLIC BLOOD PRESSURE: 140 MMHG | DIASTOLIC BLOOD PRESSURE: 80 MMHG

## 2022-02-04 DIAGNOSIS — E27.8 OTHER SPECIFIED DISORDERS OF ADRENAL GLAND: ICD-10-CM

## 2022-02-04 PROCEDURE — 99213 OFFICE O/P EST LOW 20 MIN: CPT

## 2022-02-04 RX ORDER — BLOOD PRESSURE TEST KIT-LARGE
KIT MISCELLANEOUS
Qty: 1 | Refills: 0 | Status: ACTIVE | COMMUNITY
Start: 2022-02-04 | End: 1900-01-01

## 2022-02-05 NOTE — HEALTH RISK ASSESSMENT
[Yes] : Yes [Monthly or less (1 pt)] : Monthly or less (1 point) [1 or 2 (0 pts)] : 1 or 2 (0 points) [Never (0 pts)] : Never (0 points) [No] : In the past 12 months have you used drugs other than those required for medical reasons? No [1] : 1) Little interest or pleasure doing things for several days (1) [0] : 2) Feeling down, depressed, or hopeless: Not at all (0) [Patient reported PAP Smear was normal] : Patient reported PAP Smear was normal [None] : None [Employed] : employed [Feels Safe at Home] : Feels safe at home [Fully functional (bathing, dressing, toileting, transferring, walking, feeding)] : Fully functional (bathing, dressing, toileting, transferring, walking, feeding) [Fully functional (using the telephone, shopping, preparing meals, housekeeping, doing laundry, using] : Fully functional and needs no help or supervision to perform IADLs (using the telephone, shopping, preparing meals, housekeeping, doing laundry, using transportation, managing medications and managing finances) [[id number]] : 1 [Change in mental status noted] : No change in mental status noted [Language] : denies difficulty with language [Behavior] : denies difficulty with behavior [Learning/Retaining New Information] : denies difficulty learning/retaining new information [Handling Complex Tasks] : denies difficulty handling complex tasks [Reasoning] : denies difficulty with reasoning [Spatial Ability and Orientation] : denies difficulty with spatial ability and orientation [Sexually Active] : not sexually active [High Risk Behavior] : no high risk behavior [PapSmearDate] : 10/2020 [FreeTextEntry2] :

## 2022-02-05 NOTE — HISTORY OF PRESENT ILLNESS
[de-identified] : 36 year old female being seen for a follow-up visit for adrenal evaluation/adrenal disorder and adrenal mass, hirsutism, PCOS for FOLLOW UP\par \par \par pt was in ED due to shingles in 09/2021; \par now no pain; or rashes but some scar of Right anterior thigh \par \par had epipastic pain for 1 week; no eating outside; no diarrhea/ constipation; not associate with food; intermittent nausea; no melean or blood in stool; no fhx of cancer hx; ;\par \par saw endo since 2019 for adrenal mass; was advised to do labs and do CT abd and see onco surgery but has not follow up sinec 2019';\par \par pt saw endo AND ALSO SAW ONCO SURGERY; PLANNING FOR ABD mri \par \par \par reported that she has covid19 again in 01/2022; now no sob; feeling better; but still some cough and nasal discharge;\par again refuse covid19 vaccination  \par \par \par reported anxiety and depression\par  PHQ=13 romeo =8 \par  no rashad/ no hallucination/ No delusions/ no suicidal/no harmful ideation\par \par NO Pregnant; NO T BREAST FEEDING \par \par htn: \par NOT Taking Spironolactone FOR 4-5 DAYS;DENIES OF NAY SYMPTOMS \par \par \par ROS\par \par Constitutional:  no fever and no chills. \par Eyes:  no discharge and no pain. \par HEENT:  no earache and no hearing loss. \par Cardiovascular:  no chest pain, no palpitations and no leg claudication. \par Respiratory:  no shortness of breath, no wheezing and no cough. \par Gastrointestinal:  no abdominal pain, no nausea and no constipation. \par Genitourinary:  no dysuria and no incontinence. \par Musculoskeletal:  no joint pain, no joint stiffness and no joint swelling. \par Integumentary:  no itching and no mole changes. \par Neurological:  no headache, no dizziness and no fainting. \par Psychiatric:  not suicidal, no insomnia, no anxiety and no depression. \par \par Physical Exam\par \par Constitutional:   no acute distress, well nourished, well developed and well-appearing. \par Eyes:  normal sclera/conjunctiva, pupils equal round and reactive to light and extraocular movements intact. \par ENT:  the outer ears and nose were normal in appearance and the oropharynx was normal. \par Neck:  supple, no lymphadenopathy and the thyroid was normal and there were no nodules present. \par Pulmonary:  no respiratory distress, lungs were clear to auscultation bilaterally, no accessory muscle use. \par Cardiac:  normal rate, with a regular rhythm, normal S1 and S2 and no murmur heard. \par Vascular:  there was no peripheral edema. \par Abdomen:  abdomen soft, non-tender, non-distended, no abdominal mass palpated, no HSM and normal bowel sounds. \par Lymphatic:  no posterior cervical lymphadenopathy, no anterior cervical lymphadenopathy. \par Back:  no CVA tenderness and no spinal tenderness. \par Musculoskeletal: no joint swelling and grossly normal strength/tone. \par Skin:  no rash. \par Neurology:  normal gait, coordination grossly intact, no focal deficits and deep tendon reflexes were 2+ and symmetric. \par Psychiatric:  the affect was normal, oriented to person, place, and time and insight and judgment were intact.\par

## 2022-02-05 NOTE — ASSESSMENT
[FreeTextEntry1] : recommend covid19 vaccination; pt delcine\par \par advised to follow up with \par \par \par ADRENAL MASS: \par ADVISED TO SEE ENDO \par Advised TO DO LABS AS PER ENDO; ORDER GIVEN\par CT ABD PER ENDO again\par SAW ONCO SURGERY AND PLANNING FOR MRI\par \par THYROID NODULE\par  us THYROID done \par \par EPIGASTRIC PAIN:\par NO RED FLAG SYMPTOMS\par STOOL TEST WNL \par DID CT ABD PER ENDO\par PPI for now \par REFER TO GI \par \par DYSURIA:\par advised to see her urogyn for urinary incontinent again \par \par htn: advised to take spironolactone\par add amlodipine if persistent high \par bp log \par  3 weeks follow up \par \par \par HLD: \par HEALTHY DEIT AN EXERCISE; \par 6 MONTHS FOLLOW UP \par \par covid19 infection: \par meds sent for cough\par exam limited due to TTM; but no resp distress noticed or reported \par follow up as needed\par discussed again regarding covid19 vaccination pt still refuse \par \par \par -discussed with pt the option of treatment\par -pt denies of current pregnancy or breast feeding\par -start Lexapro\par -discussed with pt in details that the possible side effect  and adverse reactions of the SSRI medication; pt aware that the medication might increase suicidal ideation at early phase; pt also agree to call 911 immediately if she experience suicidal or harmful thought; pt also understand that if she want to stop this medication, she need to call the clinic back for further instruction\par -refer to psychiatrist\par -follow up in 3 weeks\par \par \par 3 WEEKS FOLLOW UP\par \par \par \par \par

## 2022-02-28 ENCOUNTER — APPOINTMENT (OUTPATIENT)
Dept: INTERNAL MEDICINE | Facility: CLINIC | Age: 37
End: 2022-02-28
Payer: MEDICAID

## 2022-02-28 VITALS
DIASTOLIC BLOOD PRESSURE: 83 MMHG | OXYGEN SATURATION: 100 % | RESPIRATION RATE: 16 BRPM | BODY MASS INDEX: 29.99 KG/M2 | TEMPERATURE: 98.2 F | HEART RATE: 85 BPM | HEIGHT: 65 IN | SYSTOLIC BLOOD PRESSURE: 129 MMHG | WEIGHT: 180 LBS

## 2022-02-28 DIAGNOSIS — F32.A DEPRESSION, UNSPECIFIED: ICD-10-CM

## 2022-02-28 LAB
BASOPHILS # BLD AUTO: 0.09 K/UL
BASOPHILS NFR BLD AUTO: 1 %
EOSINOPHIL # BLD AUTO: 0.24 K/UL
EOSINOPHIL NFR BLD AUTO: 2.8 %
FERRITIN SERPL-MCNC: 12 NG/ML
HCT VFR BLD CALC: 36.8 %
HGB BLD-MCNC: 11 G/DL
IMM GRANULOCYTES NFR BLD AUTO: 0.2 %
IRON SATN MFR SERPL: 12 %
IRON SERPL-MCNC: 50 UG/DL
LYMPHOCYTES # BLD AUTO: 2.85 K/UL
LYMPHOCYTES NFR BLD AUTO: 32.7 %
MAN DIFF?: NORMAL
MCHC RBC-ENTMCNC: 24.7 PG
MCHC RBC-ENTMCNC: 29.9 GM/DL
MCV RBC AUTO: 82.7 FL
MONOCYTES # BLD AUTO: 0.7 K/UL
MONOCYTES NFR BLD AUTO: 8 %
NEUTROPHILS # BLD AUTO: 4.81 K/UL
NEUTROPHILS NFR BLD AUTO: 55.3 %
PLATELET # BLD AUTO: 412 K/UL
RBC # BLD: 4.45 M/UL
RBC # FLD: 16.4 %
TIBC SERPL-MCNC: 409 UG/DL
TRANSFERRIN SERPL-MCNC: 349 MG/DL
UIBC SERPL-MCNC: 359 UG/DL
WBC # FLD AUTO: 8.71 K/UL

## 2022-02-28 PROCEDURE — 99213 OFFICE O/P EST LOW 20 MIN: CPT

## 2022-02-28 RX ORDER — FERROUS SULFATE TAB EC 325 MG (65 MG FE EQUIVALENT) 325 (65 FE) MG
325 (65 FE) TABLET DELAYED RESPONSE ORAL
Qty: 90 | Refills: 1 | Status: ACTIVE | COMMUNITY
Start: 2022-02-28 | End: 1900-01-01

## 2022-02-28 NOTE — ASSESSMENT
[FreeTextEntry1] : recommend covid19 vaccination; pt delcine\par \par advised to follow up with \par \par \par ADRENAL MASS: \par ADVISED TO SEE ENDO \par Advised TO DO LABS AS PER ENDO; ORDER GIVEN\par CT ABD PER ENDO again\par SAW ONCO SURGERY AND PLANNING FOR MRI\par \par THYROID NODULE\par  us THYROID done \par \par EPIGASTRIC PAIN:\par NO RED FLAG SYMPTOMS\par STOOL TEST WNL \par DID CT ABD PER ENDO\par PPI for now \par REFER TO GI again\par \par DYSURIA:\par advised to see her urogyn for urinary incontinent again \par \par htn: advised to take spironolactone\par add amlodipine if persistent high \par bp log \par  3 weeks follow up \par \par \par HLD: \par HEALTHY DEIT AN EXERCISE; \par 6 MONTHS FOLLOW UP \par \par covid19 infection: \par meds sent for cough\par exam limited due to TTM; but no resp distress noticed or reported \par follow up as needed\par discussed again regarding covid19 vaccination pt still refuse \par \par \par -discussed with pt the option of treatment\par -pt denies of current pregnancy or breast feeding\par -start Lexapro\par -discussed with pt in details that the possible side effect  and adverse reactions of the SSRI medication; pt aware that the medication might increase suicidal ideation at early phase; pt also agree to call 911 immediately if she experience suicidal or harmful thought; pt also understand that if she want to stop this medication, she need to call the clinic back for further instruction\par -refer to psychiatrist\par -has not take and state mood improving\par advise to take  once symptoms worsen make appointment with me in 3 weeks after she start psych meds \par \par \par anemia: \par advised iron supplement\par advised to see GI and gyn again\par labs in 6 weeks \par \par \par 7 weeks follow up on TTM \par \par \par \par \par

## 2022-02-28 NOTE — HEALTH RISK ASSESSMENT
[Yes] : Yes [Monthly or less (1 pt)] : Monthly or less (1 point) [1 or 2 (0 pts)] : 1 or 2 (0 points) [Never (0 pts)] : Never (0 points) [No] : In the past 12 months have you used drugs other than those required for medical reasons? No [1] : 1) Little interest or pleasure doing things for several days (1) [0] : 2) Feeling down, depressed, or hopeless: Not at all (0) [Patient reported PAP Smear was normal] : Patient reported PAP Smear was normal [None] : None [Employed] : employed [Feels Safe at Home] : Feels safe at home [Fully functional (bathing, dressing, toileting, transferring, walking, feeding)] : Fully functional (bathing, dressing, toileting, transferring, walking, feeding) [Fully functional (using the telephone, shopping, preparing meals, housekeeping, doing laundry, using] : Fully functional and needs no help or supervision to perform IADLs (using the telephone, shopping, preparing meals, housekeeping, doing laundry, using transportation, managing medications and managing finances) [[id number]] : 1 [Change in mental status noted] : No change in mental status noted [Language] : denies difficulty with language [Behavior] : denies difficulty with behavior [Learning/Retaining New Information] : denies difficulty learning/retaining new information [Handling Complex Tasks] : denies difficulty handling complex tasks [Reasoning] : denies difficulty with reasoning [Spatial Ability and Orientation] : denies difficulty with spatial ability and orientation [Sexually Active] : not sexually active [High Risk Behavior] : no high risk behavior [FreeTextEntry2] :   [PapSmearDate] : 10/2020

## 2022-02-28 NOTE — HISTORY OF PRESENT ILLNESS
[de-identified] : 36 year old female being seen for a follow-up visit for adrenal evaluation/adrenal disorder and adrenal mass, hirsutism, PCOS for FOLLOW UP\par \par \par pt was in ED due to shingles in 09/2021; \par now no pain; or rashes but some scar of Right anterior thigh \par \par had epipastic pain for 1 week; no eating outside; no diarrhea/ constipation; not associate with food; intermittent nausea; no melean or blood in stool; no fhx of cancer hx; ;\par \par saw endo since 2019 for adrenal mass; was advised to do labs and do CT abd and see onco surgery but has not follow up [facility] 2019';\par \par pt saw endo AND ALSO SAW ONCO SURGERY; PLANNING FOR ABD mri \par \par \par reported that she has covid19 again in 01/2022; now no sob; feeling better; but still some cough and nasal discharge;\par again refuse covid19 vaccination  \par \par \par reported anxiety and depression\par  PHQ=13 romeo =8 \par  no rashad/ no hallucination/ No delusions/ no suicidal/no harmful ideation\par pt did not take depression meds as prescribed stated that she is doing well now \par \par NO Pregnant; NOT BREAST FEEDING \par \par htn: \par now taking Spironolactone \par did not take amlodipine since bp is good controlled per pt;\par \par \par \par ROS\par \par Constitutional:  no fever and no chills. \par Eyes:  no discharge and no pain. \par HEENT:  no earache and no hearing loss. \par Cardiovascular:  no chest pain, no palpitations and no leg claudication. \par Respiratory:  no shortness of breath, no wheezing and no cough. \par Gastrointestinal:  no abdominal pain, no nausea and no constipation. \par Genitourinary:  no dysuria and no incontinence. \par Musculoskeletal:  no joint pain, no joint stiffness and no joint swelling. \par Integumentary:  no itching and no mole changes. \par Neurological:  no headache, no dizziness and no fainting. \par Psychiatric:  not suicidal, no insomnia, no anxiety and no depression. \par \par Physical Exam\par \par Constitutional:   no acute distress, well nourished, well developed and well-appearing. \par Eyes:  normal sclera/conjunctiva, pupils equal round and reactive to light and extraocular movements intact. \par ENT:  the outer ears and nose were normal in appearance and the oropharynx was normal. \par Neck:  supple, no lymphadenopathy and the thyroid was normal and there were no nodules present. \par Pulmonary:  no respiratory distress, lungs were clear to auscultation bilaterally, no accessory muscle use. \par Cardiac:  normal rate, with a regular rhythm, normal S1 and S2 and no murmur heard. \par Vascular:  there was no peripheral edema. \par Abdomen:  abdomen soft, non-tender, non-distended, no abdominal mass palpated, no HSM and normal bowel sounds. \par Lymphatic:  no posterior cervical lymphadenopathy, no anterior cervical lymphadenopathy. \par Back:  no CVA tenderness and no spinal tenderness. \par Musculoskeletal: no joint swelling and grossly normal strength/tone. \par Skin:  no rash. \par Neurology:  normal gait, coordination grossly intact, no focal deficits and deep tendon reflexes were 2+ and symmetric. \par Psychiatric:  the affect was normal, oriented to person, place, and time and insight and judgment were intact.\par

## 2022-03-21 ENCOUNTER — OUTPATIENT (OUTPATIENT)
Dept: OUTPATIENT SERVICES | Facility: HOSPITAL | Age: 37
LOS: 1 days | End: 2022-03-21
Payer: MEDICAID

## 2022-03-21 ENCOUNTER — TRANSCRIPTION ENCOUNTER (OUTPATIENT)
Age: 37
End: 2022-03-21

## 2022-03-21 VITALS
OXYGEN SATURATION: 100 % | SYSTOLIC BLOOD PRESSURE: 126 MMHG | DIASTOLIC BLOOD PRESSURE: 82 MMHG | HEART RATE: 80 BPM | HEIGHT: 65 IN | WEIGHT: 184.09 LBS | RESPIRATION RATE: 16 BRPM | TEMPERATURE: 98 F

## 2022-03-21 DIAGNOSIS — N81.6 RECTOCELE: ICD-10-CM

## 2022-03-21 DIAGNOSIS — Z29.9 ENCOUNTER FOR PROPHYLACTIC MEASURES, UNSPECIFIED: ICD-10-CM

## 2022-03-21 DIAGNOSIS — Z98.890 OTHER SPECIFIED POSTPROCEDURAL STATES: Chronic | ICD-10-CM

## 2022-03-21 DIAGNOSIS — N81.10 CYSTOCELE, UNSPECIFIED: ICD-10-CM

## 2022-03-21 DIAGNOSIS — E11.9 TYPE 2 DIABETES MELLITUS WITHOUT COMPLICATIONS: ICD-10-CM

## 2022-03-21 DIAGNOSIS — E34.9 ENDOCRINE DISORDER, UNSPECIFIED: ICD-10-CM

## 2022-03-21 DIAGNOSIS — R32 UNSPECIFIED URINARY INCONTINENCE: ICD-10-CM

## 2022-03-21 DIAGNOSIS — E03.9 HYPOTHYROIDISM, UNSPECIFIED: ICD-10-CM

## 2022-03-21 DIAGNOSIS — Z01.818 ENCOUNTER FOR OTHER PREPROCEDURAL EXAMINATION: ICD-10-CM

## 2022-03-21 LAB
ANION GAP SERPL CALC-SCNC: 4 MMOL/L — LOW (ref 5–17)
BLD GP AB SCN SERPL QL: SIGNIFICANT CHANGE UP
BUN SERPL-MCNC: 9 MG/DL — SIGNIFICANT CHANGE UP (ref 7–18)
CALCIUM SERPL-MCNC: 9.2 MG/DL — SIGNIFICANT CHANGE UP (ref 8.4–10.5)
CHLORIDE SERPL-SCNC: 107 MMOL/L — SIGNIFICANT CHANGE UP (ref 96–108)
CO2 SERPL-SCNC: 26 MMOL/L — SIGNIFICANT CHANGE UP (ref 22–31)
CREAT SERPL-MCNC: 0.72 MG/DL — SIGNIFICANT CHANGE UP (ref 0.5–1.3)
EGFR: 112 ML/MIN/1.73M2 — SIGNIFICANT CHANGE UP
GLUCOSE SERPL-MCNC: 84 MG/DL — SIGNIFICANT CHANGE UP (ref 70–99)
HCT VFR BLD CALC: 36.4 % — SIGNIFICANT CHANGE UP (ref 34.5–45)
HGB BLD-MCNC: 11.5 G/DL — SIGNIFICANT CHANGE UP (ref 11.5–15.5)
MCHC RBC-ENTMCNC: 25.6 PG — LOW (ref 27–34)
MCHC RBC-ENTMCNC: 31.6 GM/DL — LOW (ref 32–36)
MCV RBC AUTO: 80.9 FL — SIGNIFICANT CHANGE UP (ref 80–100)
NRBC # BLD: 0 /100 WBCS — SIGNIFICANT CHANGE UP (ref 0–0)
PLATELET # BLD AUTO: 401 K/UL — HIGH (ref 150–400)
POTASSIUM SERPL-MCNC: 3.4 MMOL/L — LOW (ref 3.5–5.3)
POTASSIUM SERPL-SCNC: 3.4 MMOL/L — LOW (ref 3.5–5.3)
RBC # BLD: 4.5 M/UL — SIGNIFICANT CHANGE UP (ref 3.8–5.2)
RBC # FLD: 16.5 % — HIGH (ref 10.3–14.5)
SODIUM SERPL-SCNC: 137 MMOL/L — SIGNIFICANT CHANGE UP (ref 135–145)
WBC # BLD: 10.65 K/UL — HIGH (ref 3.8–10.5)
WBC # FLD AUTO: 10.65 K/UL — HIGH (ref 3.8–10.5)

## 2022-03-21 PROCEDURE — G0463: CPT

## 2022-03-21 RX ORDER — SODIUM CHLORIDE 9 MG/ML
3 INJECTION INTRAMUSCULAR; INTRAVENOUS; SUBCUTANEOUS EVERY 8 HOURS
Refills: 0 | Status: DISCONTINUED | OUTPATIENT
Start: 2022-03-22 | End: 2022-03-29

## 2022-03-21 NOTE — H&P PST ADULT - PROBLEM SELECTOR PLAN 1
Anterior and Posterior Colporrhaphy with Transobturator Tape Sling Procedure      STOP BANG : 0  Low risk for JAZMYN complication

## 2022-03-21 NOTE — H&P PST ADULT - IS PATIENT PREGNANT?
03/13/17 1500   Group 4   Start Time 1500   Stop Time 1545   Length (min) 45 min   Group Name activity   Attendance Not present      no

## 2022-03-21 NOTE — H&P PST ADULT - ASSESSMENT
37 yo female is scheduled for : Anterior and Posterior Colporrhaphy with Transobturator Tape Sling Procedure, on 3/22/22

## 2022-03-21 NOTE — H&P PST ADULT - NSANTHOSAYNRD_GEN_A_CORE
No. JAZMYN screening performed.  STOP BANG Legend: 0-2 = LOW Risk; 3-4 = INTERMEDIATE Risk; 5-8 = HIGH Risk

## 2022-03-22 ENCOUNTER — OUTPATIENT (OUTPATIENT)
Dept: OUTPATIENT SERVICES | Facility: HOSPITAL | Age: 37
LOS: 1 days | End: 2022-03-22
Payer: MEDICAID

## 2022-03-22 ENCOUNTER — RESULT REVIEW (OUTPATIENT)
Age: 37
End: 2022-03-22

## 2022-03-22 VITALS
RESPIRATION RATE: 14 BRPM | TEMPERATURE: 98 F | HEART RATE: 88 BPM | SYSTOLIC BLOOD PRESSURE: 130 MMHG | DIASTOLIC BLOOD PRESSURE: 78 MMHG | OXYGEN SATURATION: 100 %

## 2022-03-22 VITALS
SYSTOLIC BLOOD PRESSURE: 101 MMHG | HEART RATE: 79 BPM | OXYGEN SATURATION: 100 % | RESPIRATION RATE: 16 BRPM | DIASTOLIC BLOOD PRESSURE: 65 MMHG | TEMPERATURE: 98 F

## 2022-03-22 DIAGNOSIS — N81.6 RECTOCELE: ICD-10-CM

## 2022-03-22 DIAGNOSIS — Z98.890 OTHER SPECIFIED POSTPROCEDURAL STATES: Chronic | ICD-10-CM

## 2022-03-22 DIAGNOSIS — Z01.818 ENCOUNTER FOR OTHER PREPROCEDURAL EXAMINATION: ICD-10-CM

## 2022-03-22 DIAGNOSIS — N81.10 CYSTOCELE, UNSPECIFIED: ICD-10-CM

## 2022-03-22 DIAGNOSIS — R32 UNSPECIFIED URINARY INCONTINENCE: ICD-10-CM

## 2022-03-22 LAB
BLD GP AB SCN SERPL QL: SIGNIFICANT CHANGE UP
HCG UR QL: NEGATIVE — SIGNIFICANT CHANGE UP

## 2022-03-22 PROCEDURE — 86901 BLOOD TYPING SEROLOGIC RH(D): CPT

## 2022-03-22 PROCEDURE — 88307 TISSUE EXAM BY PATHOLOGIST: CPT

## 2022-03-22 PROCEDURE — 11426 EXC H-F-NK-SP B9+MARG >4 CM: CPT

## 2022-03-22 PROCEDURE — 36415 COLL VENOUS BLD VENIPUNCTURE: CPT

## 2022-03-22 PROCEDURE — 88307 TISSUE EXAM BY PATHOLOGIST: CPT | Mod: 26

## 2022-03-22 PROCEDURE — 88302 TISSUE EXAM BY PATHOLOGIST: CPT | Mod: 26

## 2022-03-22 PROCEDURE — 86850 RBC ANTIBODY SCREEN: CPT

## 2022-03-22 PROCEDURE — C1771: CPT

## 2022-03-22 PROCEDURE — 57288 REPAIR BLADDER DEFECT: CPT

## 2022-03-22 PROCEDURE — 81025 URINE PREGNANCY TEST: CPT

## 2022-03-22 PROCEDURE — 57260 CMBN ANT PST COLPRHY: CPT

## 2022-03-22 PROCEDURE — 88302 TISSUE EXAM BY PATHOLOGIST: CPT

## 2022-03-22 PROCEDURE — 86900 BLOOD TYPING SEROLOGIC ABO: CPT

## 2022-03-22 DEVICE — SYS SLING SINGLE INCISION  7.75CM: Type: IMPLANTABLE DEVICE | Status: FUNCTIONAL

## 2022-03-22 DEVICE — SLING OBTRYX II TOT: Type: IMPLANTABLE DEVICE | Status: FUNCTIONAL

## 2022-03-22 RX ORDER — ACETAMINOPHEN 500 MG
2 TABLET ORAL
Qty: 40 | Refills: 1
Start: 2022-03-22 | End: 2022-03-31

## 2022-03-22 RX ORDER — FERROUS SULFATE 325(65) MG
1 TABLET ORAL
Qty: 0 | Refills: 0 | DISCHARGE

## 2022-03-22 RX ORDER — SPIRONOLACTONE 25 MG/1
1 TABLET, FILM COATED ORAL
Qty: 0 | Refills: 0 | DISCHARGE

## 2022-03-22 RX ORDER — METFORMIN HYDROCHLORIDE 850 MG/1
1 TABLET ORAL
Qty: 0 | Refills: 0 | DISCHARGE

## 2022-03-22 RX ORDER — ACETAMINOPHEN 500 MG
1000 TABLET ORAL ONCE
Refills: 0 | Status: DISCONTINUED | OUTPATIENT
Start: 2022-03-22 | End: 2022-03-22

## 2022-03-22 RX ORDER — MOXIFLOXACIN HYDROCHLORIDE TABLETS, 400 MG 400 MG/1
1 TABLET, FILM COATED ORAL
Qty: 10 | Refills: 0
Start: 2022-03-22 | End: 2022-03-26

## 2022-03-22 RX ORDER — ONDANSETRON 8 MG/1
4 TABLET, FILM COATED ORAL ONCE
Refills: 0 | Status: DISCONTINUED | OUTPATIENT
Start: 2022-03-22 | End: 2022-03-22

## 2022-03-22 RX ORDER — IBUPROFEN 200 MG
1 TABLET ORAL
Qty: 20 | Refills: 0
Start: 2022-03-22 | End: 2022-03-26

## 2022-03-22 RX ORDER — IBUPROFEN 200 MG
600 TABLET ORAL EVERY 6 HOURS
Refills: 0 | Status: DISCONTINUED | OUTPATIENT
Start: 2022-03-22 | End: 2022-03-29

## 2022-03-22 RX ORDER — HYDROMORPHONE HYDROCHLORIDE 2 MG/ML
0.5 INJECTION INTRAMUSCULAR; INTRAVENOUS; SUBCUTANEOUS
Refills: 0 | Status: DISCONTINUED | OUTPATIENT
Start: 2022-03-22 | End: 2022-03-22

## 2022-03-22 RX ORDER — PANTOPRAZOLE SODIUM 20 MG/1
1 TABLET, DELAYED RELEASE ORAL
Qty: 30 | Refills: 0
Start: 2022-03-22 | End: 2022-04-20

## 2022-03-22 RX ORDER — SENNOSIDES/DOCUSATE SODIUM 8.6MG-50MG
2 TABLET ORAL
Qty: 60 | Refills: 0
Start: 2022-03-22 | End: 2022-04-20

## 2022-03-22 RX ORDER — FENTANYL CITRATE 50 UG/ML
25 INJECTION INTRAVENOUS
Refills: 0 | Status: DISCONTINUED | OUTPATIENT
Start: 2022-03-22 | End: 2022-03-22

## 2022-03-22 RX ADMIN — Medication 600 MILLIGRAM(S): at 16:45

## 2022-03-22 RX ADMIN — HYDROMORPHONE HYDROCHLORIDE 0.5 MILLIGRAM(S): 2 INJECTION INTRAMUSCULAR; INTRAVENOUS; SUBCUTANEOUS at 13:12

## 2022-03-22 RX ADMIN — FENTANYL CITRATE 25 MICROGRAM(S): 50 INJECTION INTRAVENOUS at 13:29

## 2022-03-22 RX ADMIN — Medication 600 MILLIGRAM(S): at 16:15

## 2022-03-22 RX ADMIN — HYDROMORPHONE HYDROCHLORIDE 0.5 MILLIGRAM(S): 2 INJECTION INTRAMUSCULAR; INTRAVENOUS; SUBCUTANEOUS at 13:23

## 2022-03-22 NOTE — ASU DISCHARGE PLAN (ADULT/PEDIATRIC) - CARE PROVIDER_API CALL
Rodney Peter  OBSTETRICS AND GYNECOLOGY  [address], [place] [address]  Phone: [phone]  Fax: [phone]  Established Patient  Scheduled Appointment: 03/25/2022

## 2022-03-22 NOTE — ASU DISCHARGE PLAN (ADULT/PEDIATRIC) - NS MD DC FALL RISK RISK
For information on Fall & Injury Prevention, visit: [email or web address] OR  [email or web address] OR  https://www.cdc.gov/steadi/patient.html

## 2022-03-22 NOTE — ASU DISCHARGE PLAN (ADULT/PEDIATRIC) - PLEASE INDICATE TEMPERATURE IN FAHRENHEIT OR CELSIUS
"Returned call to the patient who wanted to know the procedure for dental visits. Explained that she is to take a one time dose of either amoxicillin or clindamycin approximately 1 hour prior to the dental visit which is according to the AMA/ADA guidelines. Patient stated that although PCN is not listed as an "allergy", she has been told not to take, and has not been rx'd, PCN due to an allergy to Ceftazidime. Reviewed with DARCI Rain, alex D who stated that the alternate antibiotic prophylaxis for dental work, clindamycin 600 mg is appropriate based on her medication allergies and report of being told not to take PCN. Patient was instructed to take 600 mg (2 - 300 mg caps) as a one time dose as she is leaving home for the dental visit. Not to be continued as antibiotic therapy. Verbalized her understanding. Requested that the erx clindamycin and a refill for magnesium oxide be sent to her local [facility]. Patient informed that she was being presented today to the Selection Committee for the potential of being listed for re-transplantation and that the pre-transplant coordinator, LIANA Gunter RN will call her following the meeting. Informed that the dental clearance is mandatory for listing it the committee makes that decision. Patient verbalized her understanding of all.  " 100.4F or above

## 2022-03-22 NOTE — ASU DISCHARGE PLAN (ADULT/PEDIATRIC) - ASU DC SPECIAL INSTRUCTIONSFT
SEE DR ROLLE IN THE OFFICE ON FRIDAY 3/25/2022 TO REMOVE THE CALLE CATHETER    AS PER DR ROLLE REMOVE THE VAGINAL PACKING IN 24HRS (REMOVE IT AT 12:30PM TOMORROW 3/23/2022)    NO SEX   NOTHING IN VAGINA  NO HEAVY LIFTING  NO DOUCHING    PLS COMPLETE THE 5 DAY COURSE OF THE ANTIBIOTICS PRESCRIBED BY DR ROLLE  TAKE PAIN MEDICATION AS NEEDED FOR PAIN CONTRL    TAKE DIAZ-COLACE TO PREVENT CONSTIPATION/STRAINING     DRINK A LOT OF WATER TO PREVENT CONSTIPATION SEE DR ROLLE IN THE OFFICE ON FRIDAY 3/25/2022 TO REMOVE THE CALLE CATHETER    AS PER DR ROLLE REMOVE THE VAGINAL PACKING IN 24HRS (REMOVE IT AT 12:30PM TOMORROW 3/23/2022)    NO SEX   NOTHING IN VAGINA  NO HEAVY LIFTING  NO DOUCHING    PLS COMPLETE THE 5 DAY COURSE OF THE ANTIBIOTICS PRESCRIBED BY DR ROLLE  TAKE PAIN MEDICATION AS NEEDED FOR PAIN CONTROL    TAKE DIAZ-COLACE TO PREVENT CONSTIPATION/STRAINING     DRINK A LOT OF WATER TO PREVENT CONSTIPATION

## 2022-03-23 PROBLEM — D64.9 ANEMIA, UNSPECIFIED: Chronic | Status: ACTIVE | Noted: 2022-03-21

## 2022-03-23 PROBLEM — E66.9 OBESITY, UNSPECIFIED: Chronic | Status: ACTIVE | Noted: 2022-03-21

## 2022-03-23 PROBLEM — E34.9 ENDOCRINE DISORDER, UNSPECIFIED: Chronic | Status: ACTIVE | Noted: 2022-03-21

## 2022-03-25 ENCOUNTER — APPOINTMENT (OUTPATIENT)
Dept: OBGYN | Facility: CLINIC | Age: 37
End: 2022-03-25
Payer: MEDICAID

## 2022-03-25 VITALS
WEIGHT: 186 LBS | DIASTOLIC BLOOD PRESSURE: 85 MMHG | HEART RATE: 86 BPM | OXYGEN SATURATION: 99 % | SYSTOLIC BLOOD PRESSURE: 151 MMHG | BODY MASS INDEX: 30.95 KG/M2

## 2022-03-25 PROCEDURE — 99024 POSTOP FOLLOW-UP VISIT: CPT

## 2022-03-25 NOTE — HISTORY OF PRESENT ILLNESS
[Pain is well-controlled] : pain is well-controlled [Fever] : no fever [Chills] : no chills [Nausea] : no nausea [Vomiting] : no vomiting [Clean/Dry/Intact] : clean, dry and intact [Erythema] : not erythematous [None] : no vaginal bleeding [Normal] : normal [Pathology reviewed] : pathology reviewed [de-identified] : pt comes for briceño removal

## 2022-04-11 PROBLEM — Z11.59 SCREENING FOR VIRAL DISEASE: Status: ACTIVE | Noted: 2022-01-12

## 2022-04-18 ENCOUNTER — APPOINTMENT (OUTPATIENT)
Dept: OBGYN | Facility: CLINIC | Age: 37
End: 2022-04-18
Payer: MEDICAID

## 2022-04-18 VITALS — SYSTOLIC BLOOD PRESSURE: 138 MMHG | DIASTOLIC BLOOD PRESSURE: 69 MMHG | BODY MASS INDEX: 29.95 KG/M2 | WEIGHT: 180 LBS

## 2022-04-18 DIAGNOSIS — N76.0 ACUTE VAGINITIS: ICD-10-CM

## 2022-04-18 DIAGNOSIS — B96.89 ACUTE VAGINITIS: ICD-10-CM

## 2022-04-18 PROCEDURE — 99024 POSTOP FOLLOW-UP VISIT: CPT

## 2022-04-18 RX ORDER — METRONIDAZOLE 7.5 MG/G
0.75 GEL VAGINAL
Qty: 1 | Refills: 0 | Status: ACTIVE | COMMUNITY
Start: 2022-04-18 | End: 1900-01-01

## 2022-04-18 NOTE — HISTORY OF PRESENT ILLNESS
[Pain is well-controlled] : pain is well-controlled [Fever] : no fever [Chills] : no chills [Nausea] : no nausea [Vomiting] : no vomiting [Clean/Dry/Intact] : clean, dry and intact [Erythema] : not erythematous [Discharge] : noted to have a ~M discharge [Moderate] : moderate [Alona] : yellow [Thick] : thick [None] : no vaginal bleeding [Normal] : normal [Pathology reviewed] : pathology reviewed [de-identified] : pt feels good . Voiding well , no pains . Some dischasrge no ordor .

## 2022-04-22 ENCOUNTER — APPOINTMENT (OUTPATIENT)
Dept: INTERNAL MEDICINE | Facility: CLINIC | Age: 37
End: 2022-04-22
Payer: MEDICAID

## 2022-04-22 DIAGNOSIS — R05.9 COUGH, UNSPECIFIED: ICD-10-CM

## 2022-04-22 PROCEDURE — 99442: CPT

## 2022-04-22 RX ORDER — FLUTICASONE PROPIONATE 50 UG/1
50 SPRAY, METERED NASAL DAILY
Qty: 1 | Refills: 2 | Status: ACTIVE | COMMUNITY
Start: 2022-04-22 | End: 1900-01-01

## 2022-04-22 NOTE — HISTORY OF PRESENT ILLNESS
[FreeTextEntry8] : This visit was provided via TELEPHONE. The patient, BRIDGET REYES , was located at home,[address] [place] [address]\[place] [address] , at the time of the visit. \par The provider,TIAN CORCORAN , was located at his medical office located in  at the time of the visit. The patient, and Provider participated in the TELEPHONE\par Verbal consent given on Apr 22 2022  4:00PM by the patient.\par \par \par PT REPORTED THAT SHE HAS COUGH FOR 1 WEEK; WITH NO SORE THROAT;\par NO SOB; RUNNING NOSE; NO FACIAL PAIN\par  HOME COVID19 TEST RESULT NEGATIVE;\par Taking Mucinex WITH NO HELPING;\par NO Pregnant; \par \par \par meds as sent; \par cautions to ED discussed;\par  exam limited due to TTM but pt dose not has obviously resp distress\par see me in 3 days if pertinent symptoms \par \par I spend a total of 15 minutes on the date of the encounter evaluating and treating patient\par

## 2022-04-27 NOTE — ED PROVIDER NOTE - DISCUSSED CLINICAL AND RADIOLOGICAL FINDINGS WITH, MDM
Psychotherapy Group Note    PATIENT'S NAME: Brandin Rodriguez  MRN:   [id number]  :   1983  ACCT. NUMBER: [id number]  DATE OF SERVICE: 22  START TIME:  2:00 PM  END TIME:  2:50 PM  FACILITATOR: Idalia Cole LICSW  TOPIC:  EBP Group: Coping Skills  [facility] Adult Mental Health Day Treatment  TRACK: clinic 3                              Service Modality:  Video Visit     Telemedicine Visit: The patient's condition can be safely assessed and treated via synchronous audio and visual telemedicine encounter.      Reason for Telemedicine Visit: Services only offered telehealth    Originating Site (Patient Location): Patient's home    Distant Site (Provider Location): [facility]    Consent:  The patient/guardian has verbally consented to: the potential risks and benefits of telemedicine (video visit) versus in person care; bill my insurance or make self-payment for services provided; and responsibility for payment of non-covered services.     Patient would like the video invitation sent by:  My Chart    Mode of Communication:  Video Conference via Medical Zoom    As the provider I attest to compliance with applicable laws and regulations related to telemedicine.         NUMBER OF PARTICIPANTS: 6    Summary of Group / Topics Discussed:  Coping Skills: Additional Coping Skills:  Patients discussed the benefits of nature and identified ways to increase time outdoors to reduce anxiety.  Reviewed the benefits of applying the aforementioned coping strategies.  Patients explored how these strategies might be applied to daily stressors or distressing situations.     Patient Session Goals / Objectives:    Understand the purpose and benefits of time in nature     [place] about ways to be mindful in nature    Identified ways to spend time in the outdoors    Address barriers to utilizing coping skills when in distress.      Patient Participation / Response:  Fully  participated with the group by sharing personal reflections / insights and openly received / provided feedback with other participants.    Demonstrated understanding of topics discussed through group discussion and participation, Expressed understanding of the relevance / importance of coping skills at distressing times in life and Identified / Expressed personal readiness to practice new coping skills    Treatment Plan:  Patient has a current master individualized treatment plan.  See Epic treatment plan for more information.    Idalia Jackson, LICSW        patient

## 2022-05-04 ENCOUNTER — APPOINTMENT (OUTPATIENT)
Dept: INTERNAL MEDICINE | Facility: CLINIC | Age: 37
End: 2022-05-04
Payer: MEDICAID

## 2022-05-04 VITALS
SYSTOLIC BLOOD PRESSURE: 143 MMHG | TEMPERATURE: 98.3 F | RESPIRATION RATE: 16 BRPM | HEIGHT: 65 IN | WEIGHT: 186 LBS | HEART RATE: 104 BPM | BODY MASS INDEX: 30.99 KG/M2 | OXYGEN SATURATION: 100 % | DIASTOLIC BLOOD PRESSURE: 88 MMHG

## 2022-05-04 DIAGNOSIS — F41.9 ANXIETY DISORDER, UNSPECIFIED: ICD-10-CM

## 2022-05-04 DIAGNOSIS — I10 ESSENTIAL (PRIMARY) HYPERTENSION: ICD-10-CM

## 2022-05-04 DIAGNOSIS — J45.909 UNSPECIFIED ASTHMA, UNCOMPLICATED: ICD-10-CM

## 2022-05-04 DIAGNOSIS — D64.9 ANEMIA, UNSPECIFIED: ICD-10-CM

## 2022-05-04 DIAGNOSIS — R10.13 EPIGASTRIC PAIN: ICD-10-CM

## 2022-05-04 DIAGNOSIS — E27.8 OTHER SPECIFIED DISORDERS OF ADRENAL GLAND: ICD-10-CM

## 2022-05-04 PROCEDURE — 99213 OFFICE O/P EST LOW 20 MIN: CPT

## 2022-05-04 RX ORDER — MONTELUKAST 10 MG/1
10 TABLET, FILM COATED ORAL
Qty: 30 | Refills: 5 | Status: ACTIVE | COMMUNITY
Start: 2022-05-04 | End: 1900-01-01

## 2022-05-04 RX ORDER — ALBUTEROL SULFATE 90 UG/1
108 (90 BASE) INHALANT RESPIRATORY (INHALATION)
Qty: 1 | Refills: 2 | Status: ACTIVE | COMMUNITY
Start: 2022-05-04 | End: 1900-01-01

## 2022-05-04 RX ORDER — BENZONATATE 100 MG/1
100 CAPSULE ORAL 3 TIMES DAILY
Qty: 60 | Refills: 0 | Status: ACTIVE | COMMUNITY
Start: 2022-04-22 | End: 1900-01-01

## 2022-05-04 NOTE — HEALTH RISK ASSESSMENT
[Yes] : Yes [Monthly or less (1 pt)] : Monthly or less (1 point) [1 or 2 (0 pts)] : 1 or 2 (0 points) [Never (0 pts)] : Never (0 points) [No] : In the past 12 months have you used drugs other than those required for medical reasons? No [1] : 1) Little interest or pleasure doing things for several days (1) [0] : 2) Feeling down, depressed, or hopeless: Not at all (0) [Patient reported PAP Smear was normal] : Patient reported PAP Smear was normal [None] : None [Employed] : employed [Feels Safe at Home] : Feels safe at home [Fully functional (bathing, dressing, toileting, transferring, walking, feeding)] : Fully functional (bathing, dressing, toileting, transferring, walking, feeding) [Fully functional (using the telephone, shopping, preparing meals, housekeeping, doing laundry, using] : Fully functional and needs no help or supervision to perform IADLs (using the telephone, shopping, preparing meals, housekeeping, doing laundry, using transportation, managing medications and managing finances) [[id number]] : 1 [Change in mental status noted] : No change in mental status noted [Language] : denies difficulty with language [Behavior] : denies difficulty with behavior [Learning/Retaining New Information] : denies difficulty learning/retaining new information [Handling Complex Tasks] : denies difficulty handling complex tasks [Reasoning] : denies difficulty with reasoning [Spatial Ability and Orientation] : denies difficulty with spatial ability and orientation [Sexually Active] : not sexually active [High Risk Behavior] : no high risk behavior [PapSmearDate] : 10/2020 [FreeTextEntry2] :

## 2022-05-04 NOTE — HISTORY OF PRESENT ILLNESS
[de-identified] : 36 year old female being seen for a follow-up visit for adrenal evaluation/adrenal disorder and adrenal mass, hirsutism, PCOS for FOLLOW UP\par \par \par pt was in ED due to shingles in 09/2021; \par now no pain; or rashes but some scar of Right anterior thigh \par \par had epipastic pain for 1 week; no eating outside; no diarrhea/ constipation; not associate with food; intermittent nausea; no melean or blood in stool; no fhx of cancer hx; ;\par \par saw endo since 2019 for adrenal mass; was advised to do labs and do CT abd and see onco surgery but has not follow up sinec 2019';\par \par pt saw endo AND ALSO SAW ONCO SURGERY; PLANNING FOR ABD mri \par \par \par reported that she has covid19 again in 01/2022; now no sob; feeling better; but still some cough and nasal discharge;\par again refuse covid19 vaccination  \par \par \par reported anxiety and depression\par  PHQ=13 romeo =8 \par  no rashad/ no hallucination/ No delusions/ no suicidal/no harmful ideation\par pt did not take depression meds as prescribed stated that she is doing well now \par \par NO Pregnant; NOT BREAST FEEDING \par \par htn: \par now taking Spironolactone \par did not take amlodipine since bp is good controlled per pt;\par \par \par interval hx 05/04/2022\par \par no fever or chills; still cough; some wheezing too; no sob or other symptoms \par \par ROS\par \par Constitutional:  no fever and no chills. \par Eyes:  no discharge and no pain. \par HEENT:  no earache and no hearing loss. \par Cardiovascular:  no chest pain, no palpitations and no leg claudication. \par Respiratory:  no shortness of breath, no wheezing and no cough. \par Gastrointestinal:  no abdominal pain, no nausea and no constipation. \par Genitourinary:  no dysuria and no incontinence. \par Musculoskeletal:  no joint pain, no joint stiffness and no joint swelling. \par Integumentary:  no itching and no mole changes. \par Neurological:  no headache, no dizziness and no fainting. \par Psychiatric:  not suicidal, no insomnia, no anxiety and no depression. \par \par Physical Exam\par \par Constitutional:   no acute distress, well nourished, well developed and well-appearing. \par Eyes:  normal sclera/conjunctiva, pupils equal round and reactive to light and extraocular movements intact. \par ENT:  the outer ears and nose were normal in appearance and the oropharynx was normal. \par Neck:  supple, no lymphadenopathy and the thyroid was normal and there were no nodules present. \par Pulmonary:  no respiratory distress, lungs were clear to auscultation bilaterally, no accessory muscle use. \par Cardiac:  normal rate, with a regular rhythm, normal S1 and S2 and no murmur heard. \par Vascular:  there was no peripheral edema. \par Abdomen:  abdomen soft, non-tender, non-distended, no abdominal mass palpated, no HSM and normal bowel sounds. \par Lymphatic:  no posterior cervical lymphadenopathy, no anterior cervical lymphadenopathy. \par Back:  no CVA tenderness and no spinal tenderness. \par Musculoskeletal: no joint swelling and grossly normal strength/tone. \par Skin:  no rash. \par Neurology:  normal gait, coordination grossly intact, no focal deficits and deep tendon reflexes were 2+ and symmetric. \par Psychiatric:  the affect was normal, oriented to person, place, and time and insight and judgment were intact.\par

## 2022-05-04 NOTE — ASSESSMENT
[FreeTextEntry1] : recommend covid19 vaccination; pt decline\par \par advised to follow up with \par \par \par ADRENAL MASS: \par ADVISED TO SEE ENDO \par CT ABD PER ENDO done\par SAW ONCO SURGERY AND PLANNING FOR MRI; advised to follow up \par \par THYROID NODULE\par  us THYROID done \par \par EPIGASTRIC PAIN:\par NO RED FLAG SYMPTOMS\par STOOL TEST WNL \par DID CT ABD PER ENDO\par PPI for now \par REFER TO GI again\par \par DYSURIA:\par advised to see her urogyn for urinary incontinent again \par \par htn: advised to take spironolactone\par add amlodipine if persistent high \par bp log \par  3 weeks follow up \par \par \par HLD: \par HEALTHY DEIT AN EXERCISE; \par 6 MONTHS FOLLOW UP \par \par covid19 infection: \par meds sent for cough\par exam limited due to TTM; but no resp distress noticed or reported \par follow up as needed\par discussed again regarding covid19 vaccination pt still refuse \par \par \par -discussed with pt the option of treatment\par -pt denies of current pregnancy or breast feeding\par -start Lexapro\par -discussed with pt in details that the possible side effect  and adverse reactions of the SSRI medication; pt aware that the medication might increase suicidal ideation at early phase; pt also agree to call 911 immediately if she experience suicidal or harmful thought; pt also understand that if she want to stop this medication, she need to call the clinic back for further instruction\par -refer to psychiatrist\par -has not take and state mood improving\par advise to take  once symptoms worsen make appointment with me in 3 weeks after she start psych meds \par \par \par anemia: \par advised iron supplement\par advised to see GI and gyn again\par labs in 6 weeks \par \par \par reactive airway disease:\par lung clear today\par pharynx wnl; \par no nasal discharge \par albuterol prn\par  montelukast \par \par \par 7 weeks follow up on TTM \par \par \par \par \par

## 2022-05-09 ENCOUNTER — APPOINTMENT (OUTPATIENT)
Dept: OBGYN | Facility: CLINIC | Age: 37
End: 2022-05-09
Payer: MEDICAID

## 2022-05-09 VITALS — WEIGHT: 183 LBS | SYSTOLIC BLOOD PRESSURE: 144 MMHG | BODY MASS INDEX: 30.45 KG/M2 | DIASTOLIC BLOOD PRESSURE: 89 MMHG

## 2022-05-09 PROCEDURE — 99213 OFFICE O/P EST LOW 20 MIN: CPT | Mod: 24

## 2022-05-09 RX ORDER — TRANEXAMIC ACID 650 MG/1
650 TABLET ORAL
Qty: 1 | Refills: 6 | Status: ACTIVE | COMMUNITY
Start: 2022-05-09 | End: 1900-01-01

## 2022-05-09 NOTE — HISTORY OF PRESENT ILLNESS
[FreeTextEntry1] : pt comes for f/u and evaluation . She has heavy menstruation . No intermenstrual bleding . No postcoital bleeding . She has mild anemia . I discussed the IUD a a possible cause and a try with medical therapy . vs progesterone IUD .

## 2022-06-14 ENCOUNTER — OUTPATIENT (OUTPATIENT)
Dept: OUTPATIENT SERVICES | Facility: HOSPITAL | Age: 37
LOS: 1 days | End: 2022-06-14
Payer: MEDICAID

## 2022-06-14 ENCOUNTER — APPOINTMENT (OUTPATIENT)
Dept: MRI IMAGING | Facility: CLINIC | Age: 37
End: 2022-06-14
Payer: MEDICAID

## 2022-06-14 DIAGNOSIS — Z98.890 OTHER SPECIFIED POSTPROCEDURAL STATES: Chronic | ICD-10-CM

## 2022-06-14 DIAGNOSIS — E27.8 OTHER SPECIFIED DISORDERS OF ADRENAL GLAND: ICD-10-CM

## 2022-06-14 PROCEDURE — A9585: CPT

## 2022-06-14 PROCEDURE — 74183 MRI ABD W/O CNTR FLWD CNTR: CPT

## 2022-06-14 PROCEDURE — 74183 MRI ABD W/O CNTR FLWD CNTR: CPT | Mod: 26

## 2022-06-23 ENCOUNTER — NON-APPOINTMENT (OUTPATIENT)
Age: 37
End: 2022-06-23

## 2022-07-12 ENCOUNTER — RX RENEWAL (OUTPATIENT)
Age: 37
End: 2022-07-12

## 2022-07-12 RX ORDER — ESCITALOPRAM OXALATE 10 MG/1
10 TABLET ORAL DAILY
Qty: 30 | Refills: 4 | Status: ACTIVE | COMMUNITY
Start: 2022-02-04 | End: 1900-01-01

## 2022-07-12 RX ORDER — AMLODIPINE BESYLATE 5 MG/1
5 TABLET ORAL
Qty: 30 | Refills: 4 | Status: ACTIVE | COMMUNITY
Start: 2022-02-04 | End: 1900-01-01

## 2022-07-22 NOTE — ED ADULT TRIAGE NOTE - SOURCE OF INFORMATION
Trial lenses ordered 07/22/22. Menicon Miru Monthly MF only comes in a +2.00 add power as the highest so that is what I ordered.   Patient

## 2022-07-28 ENCOUNTER — APPOINTMENT (OUTPATIENT)
Dept: GASTROENTEROLOGY | Facility: CLINIC | Age: 37
End: 2022-07-28

## 2022-07-28 VITALS
BODY MASS INDEX: 34.23 KG/M2 | HEIGHT: 62 IN | SYSTOLIC BLOOD PRESSURE: 130 MMHG | HEART RATE: 99 BPM | TEMPERATURE: 98.3 F | DIASTOLIC BLOOD PRESSURE: 89 MMHG | OXYGEN SATURATION: 97 % | WEIGHT: 186 LBS

## 2022-07-28 DIAGNOSIS — R63.5 ABNORMAL WEIGHT GAIN: ICD-10-CM

## 2022-07-28 DIAGNOSIS — Z87.19 PERSONAL HISTORY OF OTHER DISEASES OF THE DIGESTIVE SYSTEM: ICD-10-CM

## 2022-07-28 DIAGNOSIS — R10.13 EPIGASTRIC PAIN: ICD-10-CM

## 2022-07-28 PROCEDURE — 99203 OFFICE O/P NEW LOW 30 MIN: CPT

## 2022-07-28 RX ORDER — PANTOPRAZOLE 40 MG/1
40 TABLET, DELAYED RELEASE ORAL DAILY
Qty: 30 | Refills: 2 | Status: ACTIVE | COMMUNITY
Start: 2021-11-10 | End: 1900-01-01
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
## 2022-07-31 NOTE — PHYSICAL EXAM
[General Appearance - Alert] : alert [General Appearance - In No Acute Distress] : in no acute distress [Bowel Sounds] : normal bowel sounds [Abdomen Soft] : soft [Abdomen Tenderness] : non-tender [Abdomen Mass (___ Cm)] : no abdominal mass palpated [No CVA Tenderness] : no ~M costovertebral angle tenderness [No Spinal Tenderness] : no spinal tenderness [Abnormal Walk] : normal gait [Nail Clubbing] : no clubbing  or cyanosis of the fingernails [Musculoskeletal - Swelling] : no joint swelling seen [Motor Tone] : muscle strength and tone were normal [Skin Color & Pigmentation] : normal skin color and pigmentation [Skin Turgor] : normal skin turgor [] : no rash [Deep Tendon Reflexes (DTR)] : deep tendon reflexes were 2+ and symmetric [Sensation] : the sensory exam was normal to light touch and pinprick [No Focal Deficits] : no focal deficits [FreeTextEntry1] : female chaperone present

## 2022-08-04 ENCOUNTER — RX RENEWAL (OUTPATIENT)
Age: 37
End: 2022-08-04

## 2022-08-04 RX ORDER — METFORMIN HYDROCHLORIDE 500 MG/1
500 TABLET, COATED ORAL
Qty: 90 | Refills: 5 | Status: ACTIVE | COMMUNITY
Start: 2019-10-31 | End: 1900-01-01

## 2022-08-17 ENCOUNTER — APPOINTMENT (OUTPATIENT)
Dept: GASTROENTEROLOGY | Facility: HOSPITAL | Age: 37
End: 2022-08-17

## 2022-08-18 ENCOUNTER — APPOINTMENT (OUTPATIENT)
Dept: OBGYN | Facility: CLINIC | Age: 37
End: 2022-08-18

## 2022-09-24 NOTE — ED PROVIDER NOTE - NS_HEARTSCRISKFACTOR_ED_A_ED
Date of Service:  9/24/2022    Patient:  Snehal Ray    Chief Complaint:  Rectal Bleeding       HPI:  Snehal Ray is a 13 y.o.  male who presents for evaluation of rectal bleeding. Patient states that over the last 2 days he has had some blood in his stool. He notes some redness to the toilet bowl. He denies any type of rectal pain or abdominal pain. No straining to void. 's issue has been going on since at least May 2021 for which he is already had negative imaging. The problems seem to be intermittent and not happen often. Patient denies other acute symptoms. He denies any trauma to the area. Family history of \"GI issues. \"       Past Medical History:   Diagnosis Date    Concussion        No past surgical history on file. No family history on file. Social History     Socioeconomic History    Marital status: SINGLE     Spouse name: Not on file    Number of children: Not on file    Years of education: Not on file    Highest education level: Not on file   Occupational History    Not on file   Tobacco Use    Smoking status: Not on file    Smokeless tobacco: Not on file   Substance and Sexual Activity    Alcohol use: Not on file    Drug use: Not on file    Sexual activity: Not on file   Other Topics Concern    Not on file   Social History Narrative    Not on file     Social Determinants of Health     Financial Resource Strain: Not on file   Food Insecurity: Not on file   Transportation Needs: Not on file   Physical Activity: Not on file   Stress: Not on file   Social Connections: Not on file   Intimate Partner Violence: Not on file   Housing Stability: Not on file         ALLERGIES: Patient has no known allergies. Review of Systems   Gastrointestinal:  Positive for blood in stool. All other systems reviewed and are negative.     Vitals:    09/24/22 1215   BP: 120/78   Pulse: 66   Resp: 16   Temp: 98.2 °F (36.8 °C)   SpO2: 100%   Weight: 140 kg   Height: 188 cm            Physical Exam  Vitals and nursing note reviewed. Constitutional:       Appearance: Normal appearance. HENT:      Head: Normocephalic and atraumatic. Nose: Nose normal.      Mouth/Throat:      Mouth: Mucous membranes are moist.   Cardiovascular:      Rate and Rhythm: Normal rate. Pulmonary:      Effort: Pulmonary effort is normal.   Abdominal:      General: Abdomen is flat. Tenderness: There is no abdominal tenderness. There is no guarding. Musculoskeletal:         General: No deformity. Skin:     General: Skin is warm. Capillary Refill: Capillary refill takes less than 2 seconds. Neurological:      Mental Status: He is alert and oriented to person, place, and time. Psychiatric:         Mood and Affect: Mood normal.        [facility]    ED Course as of 09/24/22 2143   Sat Sep 24, 2022   1341 HGB: 13.7 [GG]      ED Course User Index  [GG] Mervin Cosme DO     VITAL SIGNS:  No data found. LABS:  No results found for this or any previous visit (from the past 6 hour(s)). IMAGING:  No orders to display         Medications During Visit:  Medications - No data to display      DECISION MAKING:  Ankush Holliday is a 13 y.o. male who comes in as above. Well-appearing patient. Hemodynamically stable. Hemoglobin unremarkable. Patient does have a picture of some blood in the toilet. As the patient does not have any abdominal pain and otherwise hemodynamically stable I do not feel the need to have an acute CT performed today as he had 1 in the past has been unremarkable. We will have patient follow-up with outpatient pediatric GI. IMPRESSION:  1. Blood in stool        DISPOSITION:  Discharged      There are no discharge medications for this patient.        Follow-up Information       Follow up With Specialties Details Why Contact Info    Yanique Reynolds MD Pediatric Medicine Call   [address]      Bo Romano MD Pediatric Gastroenterology Schedule an appointment as soon as possible for a visit   [address]  [phone]                The patient is asked to follow-up with their primary care provider in the next several days. They are to call tomorrow for an appointment. The patient is asked to return promptly for any increased concerns or worsening of symptoms. They can return to this emergency department or any other emergency department.     Procedures No Risk Factors Known

## 2022-09-29 ENCOUNTER — APPOINTMENT (OUTPATIENT)
Dept: OBGYN | Facility: CLINIC | Age: 37
End: 2022-09-29

## 2022-09-29 VITALS — WEIGHT: 189 LBS | DIASTOLIC BLOOD PRESSURE: 105 MMHG | BODY MASS INDEX: 34.57 KG/M2 | SYSTOLIC BLOOD PRESSURE: 153 MMHG

## 2022-09-29 DIAGNOSIS — N92.1 EXCESSIVE AND FREQUENT MENSTRUATION WITH IRREGULAR CYCLE: ICD-10-CM

## 2022-09-29 PROCEDURE — 99213 OFFICE O/P EST LOW 20 MIN: CPT

## 2022-09-29 NOTE — HISTORY OF PRESENT ILLNESS
[FreeTextEntry1] : pt comes for f/u she is doing much better with menstruation . On the medication .

## 2022-10-06 ENCOUNTER — ASOB RESULT (OUTPATIENT)
Age: 37
End: 2022-10-06

## 2022-10-06 ENCOUNTER — APPOINTMENT (OUTPATIENT)
Dept: OBGYN | Facility: CLINIC | Age: 37
End: 2022-10-06

## 2022-10-06 PROCEDURE — 76856 US EXAM PELVIC COMPLETE: CPT | Mod: 59

## 2022-10-06 PROCEDURE — 76830 TRANSVAGINAL US NON-OB: CPT

## 2023-01-09 ENCOUNTER — APPOINTMENT (OUTPATIENT)
Dept: OBGYN | Facility: CLINIC | Age: 38
End: 2023-01-09
Payer: MEDICAID

## 2023-01-09 VITALS — WEIGHT: 180 LBS | SYSTOLIC BLOOD PRESSURE: 135 MMHG | DIASTOLIC BLOOD PRESSURE: 90 MMHG | BODY MASS INDEX: 32.92 KG/M2

## 2023-01-09 DIAGNOSIS — B37.31 ACUTE CANDIDIASIS OF VULVA AND VAGINA: ICD-10-CM

## 2023-01-09 PROCEDURE — 99213 OFFICE O/P EST LOW 20 MIN: CPT

## 2023-01-09 RX ORDER — TERCONAZOLE 8 MG/G
0.8 CREAM VAGINAL
Qty: 1 | Refills: 0 | Status: ACTIVE | COMMUNITY
Start: 2021-06-09 | End: 1900-01-01

## 2023-01-09 RX ORDER — FLUCONAZOLE 150 MG/1
150 TABLET ORAL
Qty: 1 | Refills: 2 | Status: ACTIVE | COMMUNITY
Start: 2023-01-09 | End: 1900-01-01

## 2023-01-09 NOTE — PHYSICAL EXAM
[Vulvitis] : vulvitis [Labia Majora] : normal [Labia Minora] : normal [Discharge] : a  ~M vaginal discharge was present [Moderate] : moderate [Brown] : brown [Cheesy] : cheesy [Normal] : normal [Uterine Adnexae] : normal [FreeTextEntry4] : vaginal walls with whitish plaques on the wall .

## 2023-01-19 ENCOUNTER — LABORATORY RESULT (OUTPATIENT)
Age: 38
End: 2023-01-19

## 2023-01-19 ENCOUNTER — APPOINTMENT (OUTPATIENT)
Dept: OBGYN | Facility: CLINIC | Age: 38
End: 2023-01-19
Payer: MEDICAID

## 2023-01-19 VITALS — WEIGHT: 180 LBS | BODY MASS INDEX: 32.92 KG/M2 | SYSTOLIC BLOOD PRESSURE: 143 MMHG | DIASTOLIC BLOOD PRESSURE: 90 MMHG

## 2023-01-19 DIAGNOSIS — Z01.419 ENCOUNTER FOR GYNECOLOGICAL EXAMINATION (GENERAL) (ROUTINE) W/OUT ABNORMAL FINDINGS: ICD-10-CM

## 2023-01-19 PROCEDURE — 99395 PREV VISIT EST AGE 18-39: CPT

## 2023-01-19 NOTE — PHYSICAL EXAM
Appropriately responsive] : appropriately responsive [Alert] : alert [No Acute Distress] : no acute distress [No Lymphadenopathy] : no lymphadenopathy [Regular Rate Rhythm] : regular rate rhythm [No Murmurs] : no murmurs [Clear to Auscultation B/L] : clear to auscultation bilaterally [Soft] : soft [Non-tender] : non-tender [Non-distended] : non-distended [No HSM] : No HSM [No Lesions] : no lesions [No Mass] : no mass [Oriented x3] : oriented x3 [Examination Of The Breasts] : a normal appearance [No Masses] : no breast masses were palpable [Labia Majora] : normal [Labia Minora] : normal [Normal] : normal [Uterine Adnexae] : normal

## 2023-01-19 NOTE — COUNSELING
[Nutrition/ Exercise/ Weight Management] : nutrition, exercise, weight management [Contraception/ Emergency Contraception/ Safe Sexual Practices] : contraception, emergency contraception, safe sexual practices [Vaccines] : vaccines

## 2023-03-09 NOTE — ED PROVIDER NOTE - NS ED MD DISPO DISCHARGE
Home Purse String (Intermediate) Text: Given the location of the defect and the characteristics of the surrounding skin a purse string intermediate closure was deemed most appropriate.  Undermining was performed circumfirentially around the surgical defect.  A purse string suture was then placed and tightened.

## 2023-04-03 NOTE — ED BEHAVIORAL HEALTH ASSESSMENT NOTE - HOMICIDALITY / AGGRESSION (CURRENT/PAST)
None known in lifetime [place] Counseling: I discussed with the patient the risks of tildrakizumab including but not limited to immunosuppression, malignancy, posterior leukoencephalopathy syndrome, and serious infections.  The patient understands that monitoring is required including a PPD at baseline and must alert us or the primary physician if symptoms of infection or other concerning signs are noted.

## 2023-05-17 NOTE — ED ADULT TRIAGE NOTE - BP NONINVASIVE SYSTOLIC (MM HG)
Pt ambulated to ed rm 7 from triage. Aaox4. Pt reports going to urgent care of n/v/d and then being sent here for high bp. Pt states the n/v/d is a little better because they gave her zofran. Pt states that she had htn in the past and was put on meds but then taken off by her pcp because her bp had been good. Currently does not take any daily meds. Reports intermittent headaches and has one now. Intermittent blurred vision but not currently. Was not given any bp meds at the urgent care. In no distress. On monitors. Family at bedside. Wctm   
131

## 2023-07-13 ENCOUNTER — EMERGENCY (EMERGENCY)
Facility: HOSPITAL | Age: 38
LOS: 1 days | Discharge: HOME | End: 2023-07-13
Attending: STUDENT IN AN ORGANIZED HEALTH CARE EDUCATION/TRAINING PROGRAM
Payer: SELF-PAY

## 2023-07-13 VITALS
OXYGEN SATURATION: 98 % | DIASTOLIC BLOOD PRESSURE: 81 MMHG | TEMPERATURE: 98 F | HEIGHT: 64 IN | WEIGHT: 190.04 LBS | HEART RATE: 80 BPM | SYSTOLIC BLOOD PRESSURE: 122 MMHG | RESPIRATION RATE: 18 BRPM

## 2023-07-13 DIAGNOSIS — Z98.890 OTHER SPECIFIED POSTPROCEDURAL STATES: Chronic | ICD-10-CM

## 2023-07-13 LAB
ALBUMIN SERPL ELPH-MCNC: 3.3 G/DL — LOW (ref 3.5–5)
ALP SERPL-CCNC: 131 U/L — HIGH (ref 40–120)
ALT FLD-CCNC: 100 U/L DA — HIGH (ref 10–60)
ANION GAP SERPL CALC-SCNC: 7 MMOL/L — SIGNIFICANT CHANGE UP (ref 5–17)
AST SERPL-CCNC: 36 U/L — SIGNIFICANT CHANGE UP (ref 10–40)
BASOPHILS # BLD AUTO: 0.05 K/UL — SIGNIFICANT CHANGE UP (ref 0–0.2)
BASOPHILS NFR BLD AUTO: 0.6 % — SIGNIFICANT CHANGE UP (ref 0–2)
BILIRUB SERPL-MCNC: 0.3 MG/DL — SIGNIFICANT CHANGE UP (ref 0.2–1.2)
BUN SERPL-MCNC: 9 MG/DL — SIGNIFICANT CHANGE UP (ref 7–18)
CALCIUM SERPL-MCNC: 8.5 MG/DL — SIGNIFICANT CHANGE UP (ref 8.4–10.5)
CHLORIDE SERPL-SCNC: 106 MMOL/L — SIGNIFICANT CHANGE UP (ref 96–108)
CO2 SERPL-SCNC: 27 MMOL/L — SIGNIFICANT CHANGE UP (ref 22–31)
CREAT SERPL-MCNC: 0.7 MG/DL — SIGNIFICANT CHANGE UP (ref 0.5–1.3)
EGFR: 115 ML/MIN/1.73M2 — SIGNIFICANT CHANGE UP
EOSINOPHIL # BLD AUTO: 0.28 K/UL — SIGNIFICANT CHANGE UP (ref 0–0.5)
EOSINOPHIL NFR BLD AUTO: 3.3 % — SIGNIFICANT CHANGE UP (ref 0–6)
GLUCOSE SERPL-MCNC: 86 MG/DL — SIGNIFICANT CHANGE UP (ref 70–99)
HCG SERPL-ACNC: <1 MIU/ML — SIGNIFICANT CHANGE UP
HCT VFR BLD CALC: 36.8 % — SIGNIFICANT CHANGE UP (ref 34.5–45)
HGB BLD-MCNC: 11.9 G/DL — SIGNIFICANT CHANGE UP (ref 11.5–15.5)
IMM GRANULOCYTES NFR BLD AUTO: 0.2 % — SIGNIFICANT CHANGE UP (ref 0–0.9)
LYMPHOCYTES # BLD AUTO: 2.74 K/UL — SIGNIFICANT CHANGE UP (ref 1–3.3)
LYMPHOCYTES # BLD AUTO: 32.4 % — SIGNIFICANT CHANGE UP (ref 13–44)
MAGNESIUM SERPL-MCNC: 1.9 MG/DL — SIGNIFICANT CHANGE UP (ref 1.6–2.6)
MCHC RBC-ENTMCNC: 27.1 PG — SIGNIFICANT CHANGE UP (ref 27–34)
MCHC RBC-ENTMCNC: 32.3 GM/DL — SIGNIFICANT CHANGE UP (ref 32–36)
MCV RBC AUTO: 83.8 FL — SIGNIFICANT CHANGE UP (ref 80–100)
MONOCYTES # BLD AUTO: 0.65 K/UL — SIGNIFICANT CHANGE UP (ref 0–0.9)
MONOCYTES NFR BLD AUTO: 7.7 % — SIGNIFICANT CHANGE UP (ref 2–14)
NEUTROPHILS # BLD AUTO: 4.71 K/UL — SIGNIFICANT CHANGE UP (ref 1.8–7.4)
NEUTROPHILS NFR BLD AUTO: 55.8 % — SIGNIFICANT CHANGE UP (ref 43–77)
NRBC # BLD: 0 /100 WBCS — SIGNIFICANT CHANGE UP (ref 0–0)
PLATELET # BLD AUTO: 355 K/UL — SIGNIFICANT CHANGE UP (ref 150–400)
POTASSIUM SERPL-MCNC: 4.4 MMOL/L — SIGNIFICANT CHANGE UP (ref 3.5–5.3)
POTASSIUM SERPL-SCNC: 4.4 MMOL/L — SIGNIFICANT CHANGE UP (ref 3.5–5.3)
PROT SERPL-MCNC: 7.1 G/DL — SIGNIFICANT CHANGE UP (ref 6–8.3)
RBC # BLD: 4.39 M/UL — SIGNIFICANT CHANGE UP (ref 3.8–5.2)
RBC # FLD: 14.7 % — HIGH (ref 10.3–14.5)
SODIUM SERPL-SCNC: 140 MMOL/L — SIGNIFICANT CHANGE UP (ref 135–145)
WBC # BLD: 8.45 K/UL — SIGNIFICANT CHANGE UP (ref 3.8–10.5)
WBC # FLD AUTO: 8.45 K/UL — SIGNIFICANT CHANGE UP (ref 3.8–10.5)

## 2023-07-13 PROCEDURE — 83735 ASSAY OF MAGNESIUM: CPT

## 2023-07-13 PROCEDURE — 99284 EMERGENCY DEPT VISIT MOD MDM: CPT

## 2023-07-13 PROCEDURE — 85025 COMPLETE CBC W/AUTO DIFF WBC: CPT

## 2023-07-13 PROCEDURE — 36415 COLL VENOUS BLD VENIPUNCTURE: CPT

## 2023-07-13 PROCEDURE — 96374 THER/PROPH/DIAG INJ IV PUSH: CPT

## 2023-07-13 PROCEDURE — 84702 CHORIONIC GONADOTROPIN TEST: CPT

## 2023-07-13 PROCEDURE — 80053 COMPREHEN METABOLIC PANEL: CPT

## 2023-07-13 PROCEDURE — 96375 TX/PRO/DX INJ NEW DRUG ADDON: CPT

## 2023-07-13 PROCEDURE — 99284 EMERGENCY DEPT VISIT MOD MDM: CPT | Mod: 25

## 2023-07-13 RX ORDER — SODIUM CHLORIDE 9 MG/ML
1000 INJECTION INTRAMUSCULAR; INTRAVENOUS; SUBCUTANEOUS ONCE
Refills: 0 | Status: COMPLETED | OUTPATIENT
Start: 2023-07-13 | End: 2023-07-13

## 2023-07-13 RX ORDER — METOCLOPRAMIDE HCL 10 MG
10 TABLET ORAL ONCE
Refills: 0 | Status: COMPLETED | OUTPATIENT
Start: 2023-07-13 | End: 2023-07-13

## 2023-07-13 RX ORDER — KETOROLAC TROMETHAMINE 30 MG/ML
30 SYRINGE (ML) INJECTION ONCE
Refills: 0 | Status: DISCONTINUED | OUTPATIENT
Start: 2023-07-13 | End: 2023-07-13

## 2023-07-13 RX ORDER — CEPHALEXIN 500 MG
1 CAPSULE ORAL
Qty: 14 | Refills: 0
Start: 2023-07-13 | End: 2023-07-19

## 2023-07-13 RX ADMIN — SODIUM CHLORIDE 1000 MILLILITER(S): 9 INJECTION INTRAMUSCULAR; INTRAVENOUS; SUBCUTANEOUS at 23:02

## 2023-07-13 RX ADMIN — Medication 30 MILLIGRAM(S): at 23:02

## 2023-07-13 RX ADMIN — Medication 10 MILLIGRAM(S): at 23:02

## 2023-07-13 NOTE — ED ADULT NURSE NOTE - OBJECTIVE STATEMENT
Pt a&ox3, in ED for headache, and eye pain right worst than left and sore throat, no SOB, unlabored breathing, equal rise & fall, able to speak in full sentences, no N/V/D. Denies CP, PMH of HTN not compliant with medications.

## 2023-07-13 NOTE — ED ADULT TRIAGE NOTE - WEIGHT IN LBS
NORMAL     • Experiences normal transition Progressing    • Total weight loss less than 10% of birth weight Progressing
NORMAL     • Experiences normal transition Progressing    • Total weight loss less than 10% of birth weight Progressing          NORMAL     • Experiences normal transition Progressing    • Total weight loss less than 10% of birth weight Progr
190

## 2023-07-13 NOTE — ED PROVIDER NOTE - NSFOLLOWUPINSTRUCTIONS_ED_ALL_ED_FT
You were seen in the emergency department for headache.    Please follow-up with your primary care doctor within the next 48 hours.    Please take ibuprofen and Tylenol as prescribed on the bottles for pain control.    If you have any worsening symptoms, severe headache, dizziness, chest pain or trouble breathing, please return to the emergency department.

## 2023-07-13 NOTE — ED PROVIDER NOTE - CLINICAL SUMMARY MEDICAL DECISION MAKING FREE TEXT BOX
37-year-old female presenting with headache.  Nonfocal neuro exam.  Low concern for CVA.  Likely tension versus migraine headache.  Will get labs and treat symptoms. 37-year-old female presenting with headache.  Nonfocal neuro exam.  Low concern for CVA.  Likely tension versus migraine headache.  Will get labs and treat symptoms.    patient updated on results. headache improved. ambulatory without assistance. stable for outpatient followup.

## 2023-07-13 NOTE — ED ADULT NURSE NOTE - CHIEF COMPLAINT QUOTE
Cigarettes     Start date: 1960     Quit date: 1971     Years since quittin.7    Smokeless tobacco: Never Used   Substance and Sexual Activity    Alcohol use: Yes     Comment: occ     Drug use: No    Sexual activity: Not Currently   Lifestyle    Physical activity     Days per week: Not on file     Minutes per session: Not on file    Stress: Not on file   Relationships    Social connections     Talks on phone: Not on file     Gets together: Not on file     Attends [employer] service: Not on file     Active member of club or organization: Not on file     Attends meetings of clubs or organizations: Not on file     Relationship status: Not on file    Intimate partner violence     Fear of current or ex partner: Not on file     Emotionally abused: Not on file     Physically abused: Not on file     Forced sexual activity: Not on file   Other Topics Concern    Not on file   Social History Narrative    Not on file       Prior to Admission medications    Medication Sig Start Date End Date Taking? Authorizing Provider   amLODIPine (NORVASC) 5 MG tablet Take 5 mg by mouth daily 21  Yes Historical Provider, MD   predniSONE (DELTASONE) 20 MG tablet 2 po x 5 d then 1 po x 5 d with food.  21  Yes Cindy Miller MD   allopurinol (ZYLOPRIM) 100 MG tablet TAKE ONE TABLET BY MOUTH DAILY 3/30/21  Yes Cindy Miller MD   lisinopril (PRINIVIL;ZESTRIL) 40 MG tablet Take 0.5 tablets by mouth 2 times daily 21  Yes Cindy Miller MD   apixaban (ELIQUIS) 5 MG TABS tablet Take 1 tablet by mouth 2 times daily 21  Yes Cindy Miller MD   diclofenac sodium (VOLTAREN) 1 % GEL Apply 4 g topically 4 times daily as needed for Pain 21  Yes Cindy Miller MD   tiZANidine (ZANAFLEX) 4 MG tablet Take 1 tablet by mouth 3 times daily as needed (prn spasm) 20  Yes JACIEL Gupta   atorvastatin (LIPITOR) 40 MG tablet Take 1 tablet by mouth daily 20  Yes Cindy Miller MD   sildenafil (VIAGRA) 50 MG tablet Take 1 tablet by mouth daily as needed for Erectile Dysfunction 7/17/20  Yes Lorri Rojas MD   flecainide (TAMBOCOR) 100 MG tablet Take 100 mg by mouth 2 times daily  10/26/10  Yes Historical Provider, MD       Allergies as of 05/03/2021 - Review Complete 05/03/2021   Allergen Reaction Noted    Statins  12/15/2020       Patient Active Problem List   Diagnosis    Primary localized osteoarthrosis, lower leg    Disorder of bone and cartilage    Arthropathy    Pain in joint, lower leg    Abdominal pain    Hemorrhage of rectum and anus    Essential and other specified forms of tremor    Pain in limb    Edema    Psychosexual dysfunction with inhibited sexual excitement    Pure hypercholesterolemia    Depressive disorder, not elsewhere classified    Obesity    Essential hypertension, benign    Osteoarthritis    Status post total knee replacement    Low back pain    Chest pain    Otalgia    Paroxysmal atrial fibrillation (HCC)    Pain of toe of right foot    URTI (acute upper respiratory infection)    Rectal bleeding    Cancer of ascending colon (HCC)    Colon cancer ([address]    S/P partial colectomy    Sepsis ([address]    Colon cancer metastasized to intra-abdominal lymph node ([address]    Liver mass    Major depression, single episode, in complete remission ([address]    Body mass index (BMI) 40.0-44.9, adult    History of bilateral knee arthroplasty    Muscle weakness (generalized)    Balance problem    Pain of left lower extremity    Right ear pain       Past Medical History:   Diagnosis Date    Arthritis     Atrial fibrillation ([address]     CAD (coronary artery disease)     Colon cancer ([address]     colon 2019 dec    CPAP (continuous positive airway pressure) dependence     Difficult intubation     many years ago, has been OK recently     Gout     High blood pressure     Hyperlipidemia     Sleep apnea        Past Surgical History:   Procedure Laterality Date    ABDOMEN SURGERY      COLECTOMY      COLONOSCOPY  02/12/2014    sm sessile polyp  hemorrhoids--kassi 2019---dr mora     COLONOSCOPY N/A 10/25/2019    COLONOSCOPY WITH BIOPSY performed by Calista Boyer MD at [address] COLONOSCOPY N/A 12/18/2020    COLORECTAL CANCER SCREENING, HIGH RISK performed by Calista Boyer MD at [address] Right 12/13/2019    LAPAROSCOPIC RIGHT COLECTOMY performed by Jose Luis Martinez MD at [address] [facility] Bilateral     knees    KNEE ARTHROSCOPY  10.1994    LIVER RESECTION N/A 12/13/2019    DIAGNOSTIC LAPAROSCOPY, LIVER ULTRASOUND performed by Osmel Peña MD at [address]  10/28/2016    dr dalal==[facility]     PORT SURGERY N/A 1/9/2020    SINGLE LUMEN PORT PLACEMENT WITH FLOURO WITH C-ARM performed by Jose Luis Martinez MD at [address] Right 8/23/11    TKR on right knee    TOTAL KNEE ARTHROPLASTY Left 2/21/12    Lt TKR---dr Margarito Chen        Family History   Problem Relation Age of Onset    Diabetes Mother     High Blood Pressure Mother     Cancer Mother     Diabetes Father     High Blood Pressure Father     Cancer Father     Heart Disease Other     High Blood Pressure Other        Review of Systems   Constitutional: Negative for fatigue. Respiratory: Negative for apnea. Genitourinary: Negative for frequency. Neurological: Negative for headaches. Objective:     Vitals:  Weight BMI Neck circumference    Wt Readings from Last 3 Encounters:   05/03/21 285 lb (129.3 kg)   04/12/21 284 lb (128.8 kg)   02/26/21 288 lb (130.6 kg)    Body mass index is 42.09 kg/m².        BP HR SaO2   BP Readings from Last 3 Encounters:   05/03/21 122/70   04/12/21 138/64   02/26/21 132/80    Pulse Readings from Last 3 Encounters:   05/03/21 56   04/12/21 64   02/26/21 75    SpO2 Readings from Last 3 Encounters:   05/03/21 96%   04/12/21 98%   02/26/21 98%        Themandibular molar Class : [x]1 []2 []3      Mallampati I Airway Classification:   []1 []2 [x]3 []4      Physical Exam  Vitals signs and nursing note reviewed. Constitutional:       Appearance: Normal appearance. HENT:      Head: Atraumatic. Nose: Nose normal.      Mouth/Throat:      Mouth: Mucous membranes are moist.   Eyes:      Extraocular Movements: Extraocular movements intact. Neck:      Musculoskeletal: Normal range of motion and neck supple. Cardiovascular:      Rate and Rhythm: Normal rate and regular rhythm. Heart sounds: Normal heart sounds. Pulmonary:      Effort: Pulmonary effort is normal.      Breath sounds: Normal breath sounds. Musculoskeletal: Normal range of motion. Skin:     General: Skin is warm. Neurological:      General: No focal deficit present. Psychiatric:         Mood and Affect: Mood normal.         :   Moderate Obstructive Sleep Apnea/Hypopnea Syndrome     Diagnosis Orders   1. RENETTA treated with BiPAP     2. Dependence on other enabling machines and devices       Plan:     I adjusted the PAP pressure to the VPAP pressure of 15/10 cmwp. Use  Nasal mask. I will order PAP supplies, mask, filters. ... No orders of the defined types were placed in this encounter. Return in about 1 year (around 5/3/2022) for Reveiwing BiPAP usage and compliance report and tro.     Jose Burciaga MD  Medical Director [facility] pt stated she has a headache that started yesterday

## 2023-07-13 NOTE — ED PROVIDER NOTE - PHYSICAL EXAMINATION
General: well appearing female, no acute distress   HEENT: normocephalic, atraumatic   Respiratory: normal work of breathing  MSK: no swelling or tenderness of lower extremities, moving all extremities spontaneously   Skin: warm, dry   Neuro: A&Ox3, cranial nerves II-xII intact, no pronator drift   Psych: appropriate affect

## 2023-07-13 NOTE — ED PROVIDER NOTE - PATIENT PORTAL LINK FT
You can access the FollowMyHealth Patient Portal offered by [facility] by registering at the following website: [email or web address]. By joining [employer]’s FollowMyHealth portal, you will also be able to view your health information using other applications (apps) compatible with our system.
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
## 2023-07-13 NOTE — ED ADULT NURSE NOTE - CAS DISCH TRANSFER METHOD
Cleo  from Dr Zafar office called on Sudhakar and said they were going to do some dental work on him and because hehas pseudocholinesterase deficiancy thy want to know if he can have lidocane and septocane if so please write out a note and fax to [phone]   Private car

## 2023-07-13 NOTE — ED ADULT NURSE NOTE - NSFALLUNIVINTERV_ED_ALL_ED
Bed/Stretcher in lowest position, wheels locked, appropriate side rails in place/Call bell, personal items and telephone in reach/Instruct patient to call for assistance before getting out of bed/chair/stretcher/Non-slip footwear applied when patient is off stretcher/[place] to call system/Physically safe environment - no spills, clutter or unnecessary equipment/Purposeful proactive rounding/Room/bathroom lighting operational, light cord in reach

## 2023-07-13 NOTE — ED PROVIDER NOTE - OBJECTIVE STATEMENT
37-year-old female, no significant PMH, presenting with headache.  Patient reports she has had a headache since yesterday that improved after taking ibuprofen.  Reports she has pressure behind her eyes.  Reports she does sometimes get headaches but not typically this severe.  Feels lightheaded but no dizziness, changes in vision, nausea, vomiting, chest pain or trouble breathing.

## 2023-07-14 VITALS
TEMPERATURE: 98 F | OXYGEN SATURATION: 97 % | RESPIRATION RATE: 18 BRPM | DIASTOLIC BLOOD PRESSURE: 73 MMHG | SYSTOLIC BLOOD PRESSURE: 109 MMHG | HEART RATE: 74 BPM

## 2023-10-08 NOTE — ED BEHAVIORAL HEALTH ASSESSMENT NOTE - DESCRIPTION (FIRST USE, LAST USE, QUANTITY, FREQUENCY, DURATION)
no loss of consciousness, no gait abnormality, no headache, no sensory deficits, and no weakness.
occasional alcohol use- however not to the point of intoxication

## 2023-11-30 NOTE — ED PROVIDER NOTE - CRANIAL NERVE AND PUPILLARY EXAM
, high Cr 1.33 and low GFR 58, high ALT 43 and AST 45.8, high A1c 8.8  MyCTumblrt message sent to patient
cranial nerves 2-12 intact

## 2023-12-13 NOTE — ED ADULT TRIAGE NOTE - BP NONINVASIVE SYSTOLIC (MM HG)
Daily Note     Today's date: 2023  Patient name: Briana Vizcaino  : 1973  MRN: [id number]  Referring provider: Audra Mckeon DO  Dx:   Encounter Diagnosis     ICD-10-CM    1. Vertigo  R42                      Subjective: ***      Objective: See treatment diary below      Assessment: Tolerated treatment {Tolerated treatment :[id number]}.  Patient {assessment:}      Plan: {PLAN:[id number]}     { 122

## 2024-01-25 ENCOUNTER — APPOINTMENT (OUTPATIENT)
Dept: OBGYN | Facility: CLINIC | Age: 39
End: 2024-01-25

## 2024-02-06 ENCOUNTER — EMERGENCY (EMERGENCY)
Facility: HOSPITAL | Age: 39
LOS: 1 days | Discharge: HOME | End: 2024-02-06
Admitting: EMERGENCY MEDICINE
Payer: SELF-PAY

## 2024-02-06 VITALS
SYSTOLIC BLOOD PRESSURE: 145 MMHG | HEART RATE: 98 BPM | TEMPERATURE: 98 F | RESPIRATION RATE: 16 BRPM | OXYGEN SATURATION: 99 % | DIASTOLIC BLOOD PRESSURE: 88 MMHG

## 2024-02-06 DIAGNOSIS — Z98.890 OTHER SPECIFIED POSTPROCEDURAL STATES: Chronic | ICD-10-CM

## 2024-02-06 PROCEDURE — 99284 EMERGENCY DEPT VISIT MOD MDM: CPT

## 2024-02-06 PROCEDURE — 99053 MED SERV 10PM-8AM 24 HR FAC: CPT

## 2024-02-06 NOTE — ED ADULT TRIAGE NOTE - CHIEF COMPLAINT QUOTE
Pt c/o sore throat, congestion, fever, headache, and cough for the past few days. No past medical history.

## 2024-02-07 LAB
FLUAV AG NPH QL: SIGNIFICANT CHANGE UP
FLUBV AG NPH QL: SIGNIFICANT CHANGE UP
RSV RNA NPH QL NAA+NON-PROBE: SIGNIFICANT CHANGE UP
SARS-COV-2 RNA SPEC QL NAA+PROBE: SIGNIFICANT CHANGE UP

## 2024-02-07 RX ORDER — ACETAMINOPHEN 500 MG
975 TABLET ORAL ONCE
Refills: 0 | Status: COMPLETED | OUTPATIENT
Start: 2024-02-07 | End: 2024-02-07

## 2024-02-07 RX ORDER — ACETAMINOPHEN 500 MG
2 TABLET ORAL
Qty: 40 | Refills: 0
Start: 2024-02-07 | End: 2024-02-11

## 2024-02-07 RX ORDER — IBUPROFEN 200 MG
600 TABLET ORAL ONCE
Refills: 0 | Status: COMPLETED | OUTPATIENT
Start: 2024-02-07 | End: 2024-02-07

## 2024-02-07 RX ADMIN — Medication 975 MILLIGRAM(S): at 02:39

## 2024-02-07 RX ADMIN — Medication 600 MILLIGRAM(S): at 02:39

## 2024-02-07 RX ADMIN — Medication 100 MILLIGRAM(S): at 02:40

## 2024-02-07 NOTE — ED PROVIDER NOTE - CLINICAL SUMMARY MEDICAL DECISION MAKING FREE TEXT BOX
38-year-old female no reported past medical history presents with sore throat congestion and subjective fevers chills headache ear pain cough for 2 days.  Patient has not taken anything at home for symptom relief.  Patient unsure of sick contacts.  Patient denies any chest pain shortness of breath.  No abdominal pain nausea vomiting diarrhea.    likely viral syndrome    plan  - ibuprofen/tylenol/benzonatate  - flu/covid swab

## 2024-02-07 NOTE — ED PROVIDER NOTE - PATIENT PORTAL LINK FT
You can access the FollowMyHealth Patient Portal offered by [facility] by registering at the following website: [email or web address]. By joining [employer]’s FollowMyHealth portal, you will also be able to view your health information using other applications (apps) compatible with our system.

## 2024-02-07 NOTE — ED PROVIDER NOTE - GASTROINTESTINAL, MLM
Clinic Visit Summary    July 21, 2017      ROSE TRAN Description:  Female YOB: 1952   MRN: [id number] Provider:  Ricardo Holland M.D.    Primary Physician:  Steffen Daniels MD  Referring Physician:  Mile Parks MD     Insurance Information  [facility] [employer]/MTRA  PPO [id number] 7/10/2017     Reason for Visit     Follow-Up    Health History  Malignant neoplasm of upper-outer quadrant of right female breast [ICD10] C50.411    Vitals (last recorded)  BP Pulse Height Weight Temp   129/74  74  59.5in (151.13cm)  143.3lb  (65kg)   ºc     Allergies (as reported by patient)  Allergy Type Name Reaction   Medication Allergy acetylsalicylic acid Unknown      Current Medications (as reported by patient)  Drug Name Dose Route   anastrozole   Oral   Calcium + D [calcium carbonate-vitamin D3] 1 tab Oral        Medications prescribed today  Date Description Ordering Physician      Orders  Date Description Ordering Physician     Next Visit Information  Appointment Date Appointment Time Activity   2/14/2018 2:30 PM Registration - [facility]   2/14/2018 2:45 PM RN Follow Up   2/14/2018 3:00 PM Follow Up     Special instructions              Reminders  · If you did not schedule your follow up appointment at the time of your visit, please call the department at [phone].  · Please provide a copy of this summary of care to your next provider.     Abdomen soft, non-tender, no guarding.

## 2024-02-07 NOTE — ED PROVIDER NOTE - OBJECTIVE STATEMENT
38-year-old female no reported past medical history presents with sore throat congestion and subjective fevers chills headache ear pain cough for 2 days.  Patient has not taken anything at home for symptom relief.  Patient unsure of sick contacts.  Patient denies any chest pain shortness of breath.  No abdominal pain nausea vomiting diarrhea.

## 2024-02-07 NOTE — ED POST DISCHARGE NOTE - RESULT SUMMARY
Received call from patient who was requesting viral swab results.  Viral swab results provided to patient at this time.

## 2024-02-07 NOTE — ED ADULT NURSE NOTE - OBJECTIVE STATEMENT
39 y/o F presents to ED intake A&ox4 c/o flu like sx. endorsing sore throat, fever, aches, and HA. respirations even and unlabored. no acute distress noted. medicated as per orders. awaiting DC. RVP drawn and sent. denies PMHx

## 2024-03-04 NOTE — ASSESSMENT
[FreeTextEntry1] : This is a 36 year old female here for an evaluation of epigastric pain and bloating. \par Likely gastritis vs H-pylori vs ulcer \par \par My plan\par -EGD \par -c/w omeprazole 40mg \par -covid swab \par \par \par Risks, benefits, and alternatives of EGD discussed at length with patient including but not limited to bleeding perforation, anesthesia related complication, aspiration, etc. Patient expressed understanding.\par \par EGD for evaluation of polyps, tumors, nodules with +/- biopsy and or cauterization w/ and or w/o clipping. \par   [FreeTextEntry1] : Helen is a 17 month old F who presents with Mother for initial evaluation regarding intoeing. Patient is the product of a full term normal pregnancy, born via CS delivery due to chauncey side up presentation with face upwards, no history of breech presentation. She started to walk at 10 months old. No need for any early intervention services, no PT/OT needed. No signs of discomfort/pain, no limping, no episodes of refusal to ambulate, no recent injuries, no recent fevers.  There is family history of intoeing in paternal grandfather, who required lower extremity braces as a child.

## 2024-05-03 NOTE — ED ADULT NURSE NOTE - MODE OF DISCHARGE

## 2024-06-25 ENCOUNTER — EMERGENCY (EMERGENCY)
Facility: HOSPITAL | Age: 39
LOS: 1 days | Discharge: HOME | End: 2024-06-25
Attending: STUDENT IN AN ORGANIZED HEALTH CARE EDUCATION/TRAINING PROGRAM
Payer: COMMERCIAL

## 2024-06-25 VITALS
HEART RATE: 61 BPM | RESPIRATION RATE: 18 BRPM | TEMPERATURE: 98 F | SYSTOLIC BLOOD PRESSURE: 144 MMHG | DIASTOLIC BLOOD PRESSURE: 89 MMHG | OXYGEN SATURATION: 100 %

## 2024-06-25 VITALS
HEIGHT: 65 IN | HEART RATE: 80 BPM | OXYGEN SATURATION: 95 % | TEMPERATURE: 98 F | RESPIRATION RATE: 18 BRPM | DIASTOLIC BLOOD PRESSURE: 85 MMHG | SYSTOLIC BLOOD PRESSURE: 143 MMHG | WEIGHT: 203.93 LBS

## 2024-06-25 DIAGNOSIS — Z98.890 OTHER SPECIFIED POSTPROCEDURAL STATES: Chronic | ICD-10-CM

## 2024-06-25 LAB
ALBUMIN SERPL ELPH-MCNC: 3.7 G/DL — SIGNIFICANT CHANGE UP (ref 3.5–5)
ALP SERPL-CCNC: 111 U/L — SIGNIFICANT CHANGE UP (ref 40–120)
ALT FLD-CCNC: 54 U/L DA — SIGNIFICANT CHANGE UP (ref 10–60)
ANION GAP SERPL CALC-SCNC: 3 MMOL/L — LOW (ref 5–17)
APPEARANCE UR: CLEAR — SIGNIFICANT CHANGE UP
AST SERPL-CCNC: 31 U/L — SIGNIFICANT CHANGE UP (ref 10–40)
BASOPHILS # BLD AUTO: 0.05 K/UL — SIGNIFICANT CHANGE UP (ref 0–0.2)
BASOPHILS NFR BLD AUTO: 0.6 % — SIGNIFICANT CHANGE UP (ref 0–2)
BILIRUB SERPL-MCNC: 0.4 MG/DL — SIGNIFICANT CHANGE UP (ref 0.2–1.2)
BILIRUB UR-MCNC: NEGATIVE — SIGNIFICANT CHANGE UP
BUN SERPL-MCNC: 9 MG/DL — SIGNIFICANT CHANGE UP (ref 7–18)
CALCIUM SERPL-MCNC: 9.4 MG/DL — SIGNIFICANT CHANGE UP (ref 8.4–10.5)
CHLORIDE SERPL-SCNC: 109 MMOL/L — HIGH (ref 96–108)
CO2 SERPL-SCNC: 26 MMOL/L — SIGNIFICANT CHANGE UP (ref 22–31)
COLOR SPEC: YELLOW — SIGNIFICANT CHANGE UP
CREAT SERPL-MCNC: 0.74 MG/DL — SIGNIFICANT CHANGE UP (ref 0.5–1.3)
DIFF PNL FLD: NEGATIVE — SIGNIFICANT CHANGE UP
EGFR: 107 ML/MIN/1.73M2 — SIGNIFICANT CHANGE UP
EOSINOPHIL # BLD AUTO: 0.16 K/UL — SIGNIFICANT CHANGE UP (ref 0–0.5)
EOSINOPHIL NFR BLD AUTO: 1.8 % — SIGNIFICANT CHANGE UP (ref 0–6)
GLUCOSE SERPL-MCNC: 84 MG/DL — SIGNIFICANT CHANGE UP (ref 70–99)
GLUCOSE UR QL: NEGATIVE MG/DL — SIGNIFICANT CHANGE UP
HCG UR QL: NEGATIVE — SIGNIFICANT CHANGE UP
HCT VFR BLD CALC: 37 % — SIGNIFICANT CHANGE UP (ref 34.5–45)
HGB BLD-MCNC: 12.5 G/DL — SIGNIFICANT CHANGE UP (ref 11.5–15.5)
IMM GRANULOCYTES NFR BLD AUTO: 0.2 % — SIGNIFICANT CHANGE UP (ref 0–0.9)
KETONES UR-MCNC: NEGATIVE MG/DL — SIGNIFICANT CHANGE UP
LEUKOCYTE ESTERASE UR-ACNC: NEGATIVE — SIGNIFICANT CHANGE UP
LYMPHOCYTES # BLD AUTO: 2.66 K/UL — SIGNIFICANT CHANGE UP (ref 1–3.3)
LYMPHOCYTES # BLD AUTO: 29.6 % — SIGNIFICANT CHANGE UP (ref 13–44)
MAGNESIUM SERPL-MCNC: 1.9 MG/DL — SIGNIFICANT CHANGE UP (ref 1.6–2.6)
MCHC RBC-ENTMCNC: 26.9 PG — LOW (ref 27–34)
MCHC RBC-ENTMCNC: 33.8 GM/DL — SIGNIFICANT CHANGE UP (ref 32–36)
MCV RBC AUTO: 79.6 FL — LOW (ref 80–100)
MONOCYTES # BLD AUTO: 0.6 K/UL — SIGNIFICANT CHANGE UP (ref 0–0.9)
MONOCYTES NFR BLD AUTO: 6.7 % — SIGNIFICANT CHANGE UP (ref 2–14)
NEUTROPHILS # BLD AUTO: 5.51 K/UL — SIGNIFICANT CHANGE UP (ref 1.8–7.4)
NEUTROPHILS NFR BLD AUTO: 61.1 % — SIGNIFICANT CHANGE UP (ref 43–77)
NITRITE UR-MCNC: NEGATIVE — SIGNIFICANT CHANGE UP
NRBC # BLD: 0 /100 WBCS — SIGNIFICANT CHANGE UP (ref 0–0)
PH UR: 7 — SIGNIFICANT CHANGE UP (ref 5–8)
PLATELET # BLD AUTO: 359 K/UL — SIGNIFICANT CHANGE UP (ref 150–400)
POTASSIUM SERPL-MCNC: 3.9 MMOL/L — SIGNIFICANT CHANGE UP (ref 3.5–5.3)
POTASSIUM SERPL-SCNC: 3.9 MMOL/L — SIGNIFICANT CHANGE UP (ref 3.5–5.3)
PROT SERPL-MCNC: 7.8 G/DL — SIGNIFICANT CHANGE UP (ref 6–8.3)
PROT UR-MCNC: NEGATIVE MG/DL — SIGNIFICANT CHANGE UP
RBC # BLD: 4.65 M/UL — SIGNIFICANT CHANGE UP (ref 3.8–5.2)
RBC # FLD: 14.3 % — SIGNIFICANT CHANGE UP (ref 10.3–14.5)
SODIUM SERPL-SCNC: 138 MMOL/L — SIGNIFICANT CHANGE UP (ref 135–145)
SP GR SPEC: 1.02 — SIGNIFICANT CHANGE UP (ref 1–1.03)
UROBILINOGEN FLD QL: 0.2 MG/DL — SIGNIFICANT CHANGE UP (ref 0.2–1)
WBC # BLD: 9 K/UL — SIGNIFICANT CHANGE UP (ref 3.8–10.5)
WBC # FLD AUTO: 9 K/UL — SIGNIFICANT CHANGE UP (ref 3.8–10.5)

## 2024-06-25 PROCEDURE — 81025 URINE PREGNANCY TEST: CPT

## 2024-06-25 PROCEDURE — 74177 CT ABD & PELVIS W/CONTRAST: CPT | Mod: MC

## 2024-06-25 PROCEDURE — 85025 COMPLETE CBC W/AUTO DIFF WBC: CPT

## 2024-06-25 PROCEDURE — 72125 CT NECK SPINE W/O DYE: CPT | Mod: 26,MC

## 2024-06-25 PROCEDURE — 99285 EMERGENCY DEPT VISIT HI MDM: CPT

## 2024-06-25 PROCEDURE — 82962 GLUCOSE BLOOD TEST: CPT

## 2024-06-25 PROCEDURE — 96374 THER/PROPH/DIAG INJ IV PUSH: CPT | Mod: XU

## 2024-06-25 PROCEDURE — 74177 CT ABD & PELVIS W/CONTRAST: CPT | Mod: 26,MC

## 2024-06-25 PROCEDURE — 81003 URINALYSIS AUTO W/O SCOPE: CPT

## 2024-06-25 PROCEDURE — 80053 COMPREHEN METABOLIC PANEL: CPT

## 2024-06-25 PROCEDURE — 99284 EMERGENCY DEPT VISIT MOD MDM: CPT | Mod: 25

## 2024-06-25 PROCEDURE — 70450 CT HEAD/BRAIN W/O DYE: CPT | Mod: 26,MC

## 2024-06-25 PROCEDURE — 96361 HYDRATE IV INFUSION ADD-ON: CPT

## 2024-06-25 PROCEDURE — 70450 CT HEAD/BRAIN W/O DYE: CPT | Mod: MC

## 2024-06-25 PROCEDURE — 83735 ASSAY OF MAGNESIUM: CPT

## 2024-06-25 PROCEDURE — 72125 CT NECK SPINE W/O DYE: CPT | Mod: MC

## 2024-06-25 PROCEDURE — 36415 COLL VENOUS BLD VENIPUNCTURE: CPT

## 2024-06-25 RX ORDER — SODIUM CHLORIDE 9 MG/ML
1000 INJECTION INTRAMUSCULAR; INTRAVENOUS; SUBCUTANEOUS ONCE
Refills: 0 | Status: COMPLETED | OUTPATIENT
Start: 2024-06-25 | End: 2024-06-25

## 2024-06-25 RX ORDER — KETOROLAC TROMETHAMINE 30 MG/ML
15 SYRINGE (ML) INJECTION ONCE
Refills: 0 | Status: DISCONTINUED | OUTPATIENT
Start: 2024-06-25 | End: 2024-06-25

## 2024-06-25 RX ADMIN — SODIUM CHLORIDE 1000 MILLILITER(S): 9 INJECTION INTRAMUSCULAR; INTRAVENOUS; SUBCUTANEOUS at 12:13

## 2024-06-25 RX ADMIN — Medication 15 MILLIGRAM(S): at 12:49

## 2024-06-25 RX ADMIN — Medication 15 MILLIGRAM(S): at 13:19

## 2024-06-25 RX ADMIN — SODIUM CHLORIDE 1000 MILLILITER(S): 9 INJECTION INTRAMUSCULAR; INTRAVENOUS; SUBCUTANEOUS at 13:13

## 2024-06-25 NOTE — ED ADULT TRIAGE NOTE - CHIEF COMPLAINT QUOTE
PT REPORTS NUMBNESS OF LEFT ARM X 2 WEEKS, HEADACHE AND DISCHARGE FROM UMBILICAL AREA X SEVERAL DAYS

## 2024-06-25 NOTE — ED PROVIDER NOTE - PATIENT PORTAL LINK FT
You can access the FollowMyHealth Patient Portal offered by [facility] by registering at the following website: [email or web address]. By joining [employer]’s FollowMyHealth portal, you will also be able to view your health information using other applications (apps) compatible with our system.

## 2024-06-25 NOTE — ED PROVIDER NOTE - PHYSICAL EXAMINATION
Gen: NAD, AOx3, able to make needs known, non-toxic  Head: NCAT  HEENT: EOMI, oral mucosa moist, normal conjunctiva  Lung: CTAB, no respiratory distress, no wheezes/rhonchi/rales B/L, speaking in full sentences  CV: RRR, no murmurs  Abd: LLQ ttp, non distended, soft, no guarding, midline surgical scar, scant yellow discharge in umbilicus, no overlying skin changes,  no CVA tenderness  MSK: no visible deformities  Neuro: Appears non focal  Skin: Warm, well perfused, no rash  Psych: normal affect

## 2024-06-25 NOTE — ED PROVIDER NOTE - PROGRESS NOTE DETAILS
Labs nonactionable, CT and CTAP incidental findings noted and discussed with patient.  Patient aware to follow-up with PCP will give referral.  Red flag signs and symptoms discussed as well as strict return precautions patient verbalized understanding.

## 2024-06-25 NOTE — ED PROVIDER NOTE - NSFOLLOWUPINSTRUCTIONS_ED_ALL_ED_FT
1) Follow up with your doctor as discussed  2) Return to the ED immediately for new or worsening symptoms   3) Please continue to take any home medications as prescribed  4) Your test results from your ED visit were discussed with you prior to discharge  5) You were provided with a copy of your test results

## 2024-06-25 NOTE — ED PROVIDER NOTE - NSFOLLOWUPCLINICS_GEN_ALL_ED_FT
[facility]  Internal Medicine  [address] [place] [address]  Phone: [phone]  Fax: [phone]
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
## 2024-06-25 NOTE — ED ADULT NURSE NOTE - OBJECTIVE STATEMENT
Patient presented to the ED complaining of numbness to left arm for 2 weeks with headache and drainage from umbilical area for several days.

## 2024-06-25 NOTE — ED PROVIDER NOTE - ATTENDING APP SHARED VISIT CONTRIBUTION OF CARE
I, Ana Pappas DO reviewed the [facility] plan of care and discussed the case with the ACP.  I was available for any questions and concerns

## 2024-06-25 NOTE — ED PROVIDER NOTE - NSPTACCESSSVCSAPPTDETAILS_ED_ALL_ED_FT
Spine- left upper arm tingling with  Mild bilateral C4-C5 and C5-C6 foraminal narrowing due to uncovertebral   joint spurring.  PCP- Needs routine follow up states having trouble seeing her primary would like new one

## 2024-06-25 NOTE — ED ADULT TRIAGE NOTE - ARRIVAL FROM
Assessment/Plan:    Dandy-Walker syndrome (HCC)  Just saw Neuro and had no meds changed and no studies ordered, told to f/u in 6 mo    Hydrocephalus  H/o congenital hydrocephalus s/p shunt, just saw neuro,  No recent neuro changes, con't f/u as per Neuro    Ambulatory dysfunction  In PT with some improvement but PT requesting extension of therapy - order written, call with falls    Malignant neoplasm of overlapping sites of right breast in female, estrogen receptor positive ([facility] [address] )  On Arimidex and UTD on mammo, con't f/u as per Onc, con't mammo as per Onc    Multiple thyroid nodules  Thyroid nodules stable on US and TFT"s wnl, con't current f/u as per Endo    Osteoporosis  Had eval with Endo and d/t dental dz was recommended IV bisphosphonate or Prolia but pts family deferred - Dexa 2/21 reviewed, Fosamax PO was started and tolerating well w/o issues, follows with dentist regularly, advised [facility] staff to ensure pts family is aware of SE of oral bisophosphonates with osteonecrosis of jaw with dental dz, call if any concerns develop    C[facility]bral palsy ([facility] )  Having some gait issues - in PT with benefit - referral for resizing of shoes given upon request of PT    Obsessive compulsive disorder  Carbamazepine recently adjusted - med list updated, con't meds as per psych       Diagnoses and all orders for this visit:    Dandy-Walker syndrome ([facility] )    Obsessive-compulsive disorder, unspecified type  -     carBAMazepine (TEGretol) 200 mg tablet; Take 1 tablet (200 mg total) by mouth 2 (two) times a day    Cerebral palsy, unspecified type [facility])    Ambulatory dysfunction    Malignant neoplasm of overlapping sites of right breast in female, estrogen receptor positive ([facility] )    Multiple thyroid nodules    Osteoporosis, unspecified osteoporosis type, unspecified pathological fracture presence    Hydrocephalus, unspecified type ([facility] )    BMI 26 0-26 9,adult    Overweight (BMI 25 0-29  9)    Other orders  -     ketorolac (ACULAR) 0 5 % ophthalmic solution  -     ofloxacin (OCUFLOX) 0 3 % ophthalmic solution      Going to have cataract in June - the 14th  Has to have BW and ECG and will need preop forms filled out    Colonoscopy 10/20 - 5 yrs  FIT neg 12/20    Mammo 7/20    Dexa 10/20 - osteoporosis    PAP 1/19 -   [facility]    BW 12/20  FLP 8/20      Subjective:      Patient ID: Irma Fields is a 55 y o  female  HPI Pt here with [facility] staff for follow up appt    Pt was seen by Neuro Iva Alberto) in March for f/u Charlychrissiee Puller and congenital hydrocephalus  OV note was reviewed by myself  She had no meds changed and no studies ordered  She was told to f/u in 6 mos  Pt had a video visit with Onc (Dr Roge Otoole) in Jan for f/u breast cancer - OV note reviewed  She con't to take her Arimidex and Tamoxifen as directed  She had her L mammo in July of 2020  She was told to f/u in 6 mos  Pt saw Endo (Dr Ana Glover) in Nov of 2020 for f/u of her thyroid nodules  She had a thyroid US in Nov 2020 and nodules were stable  She was told to f/u in 1 yr  She has dx of osteoporosis and had Dexa 10/20  She was started on Fosamax in Feb but after review of Endo note today they were recommended Prolia or IV bisphosphonate over PO Fosamax  [facility] staff states pts family deferred IV tx d/t review of SE  Pt currently in PT for L shoulder pain and gait dysfunction  PT recommended having shoes resized d/t some issues walking out of shoes  PT has recommended con't PT d/t not yet being at goal   Pt feels PT has been helping  Wgt up 21 lbs from Aug 2020  BMI reviewed at 32  Pt states she gags on her vegetables  She does PT and was doing the treadmill prior to gait issues  Going to have cataract in June - the 14th    Has to have BW and ECG and will need preop forms filled out    Colonoscopy 10/20 - 5 yrs  FIT neg 12/20    Mammo 7/20    Dexa 10/20 - osteoporosis    PAP 1/19 -   [facility] 12/20  [address] 8/20    Review of Systems   Constitutional: Negative for fever  Eyes: Positive for visual disturbance  Respiratory: Negative for cough and shortness of breath  Cardiovascular: Negative for chest pain and leg swelling  Gastrointestinal: Negative for constipation, diarrhea and vomiting  Musculoskeletal: Positive for arthralgias and gait problem  Skin: Negative for rash and wound  Neurological: Negative for seizures, weakness and headaches  Hematological: Does not bruise/bleed easily  Psychiatric/Behavioral: Negative for behavioral problems and sleep disturbance  Objective:    /68   Pulse 86   Temp 98 2 °F (36 8 °C) (Temporal)   Ht 4' 9 5" (1 461 m)   Wt 57 2 kg (126 lb)   BMI 26 79 kg/m²      Physical Exam  Vitals signs and nursing note reviewed  Constitutional:       General: She is not in acute distress  Appearance: She is well-developed  She is not ill-appearing  HENT:      Head: Normocephalic and atraumatic  Eyes:      General:         Right eye: No discharge  Left eye: No discharge  Conjunctiva/sclera: Conjunctivae normal    Neck:      Musculoskeletal: Neck supple  Trachea: No tracheal deviation  Cardiovascular:      Rate and Rhythm: Normal rate and regular rhythm  Heart sounds: Normal heart sounds  No murmur  No friction rub  Pulmonary:      Effort: Pulmonary effort is normal  No respiratory distress  Breath sounds: Normal breath sounds  No wheezing, rhonchi or rales  Abdominal:      General: There is no distension  Palpations: Abdomen is soft  Tenderness: There is no abdominal tenderness  There is no guarding or rebound  Musculoskeletal:      Right lower leg: No edema  Left lower leg: No edema  Comments: L shoulder with decrease in AROM in all motions, symmetric muscle strength B/L UE   Skin:     General: Skin is warm  Coloration: Skin is not pale  Findings: No rash  Neurological:      General: No focal deficit present        Mental Status: She is alert  Motor: No abnormal muscle tone  Psychiatric:         Behavior: Behavior normal       Comments: Anxious today         BMI Counseling: Body mass index is 26 79 kg/m²  The BMI is above normal  Nutrition recommendations include reducing portion sizes, 3-5 servings of fruits/vegetables daily, consuming healthier snacks, moderation in carbohydrate intake, increasing intake of lean protein and reducing intake of saturated fat and trans fat  Exercise recommendations include exercising 3-5 times per week  Home

## 2024-06-26 NOTE — ED PROVIDER NOTE - CARE PLAN
Chief Complaint/Reason for Visit:   Chief Complaint   Patient presents with    Cardiac Clearance     Thoracoscopy procedure, possible thoracotomy date tbd with Dr. Bar Doherty at [facility]     HISTORY OF PRESENT ILLNESS: Arnel Nolasco is a 80 year old male with history of CVA, peripheral vascular disease with prior intervention, hypertension, dyslipidemia, CKD who presents to the office today for preoperative cardiac evaluation prior to thoracoscopy procedure with possible thoracotomy.  Patient denies any chest pain, shortness of breath, dizziness, palpitations, orthopnea, or edema.  He states that he can walk up >2 flights of stairs without stopping to rest.    REVIEW OF SYSTEMS:  Review of Systems   Constitutional: Negative for chills, decreased appetite and fever.   HENT: Negative.     Eyes: Negative.    Cardiovascular:  Negative for chest pain, dyspnea on exertion, leg swelling, orthopnea and palpitations.   Respiratory:  Negative for cough and shortness of breath.    Endocrine: Negative.    Skin: Negative.    Musculoskeletal: Negative.    Gastrointestinal: Negative.    Genitourinary: Negative.    Neurological:  Negative for dizziness and light-headedness.   Psychiatric/Behavioral: Negative.         PAST MEDICAL HISTORY:   Past Medical History:   Diagnosis Date    CKD (chronic kidney disease)     Diabetes mellitus  (CMD)     Essential (primary) hypertension     Gastroesophageal reflux disease     High cholesterol     Lung cancer  (CMD)     Myasthenia gravis  (CMD)     Patient has active power of  for health care 11/16/2015    Maria Luisa Nolasco, wife    Prostate cancer  (CMD)     s/p robotic assisted laparoscopic prostatectomy       PAST SURGICAL HISTORY:   Past Surgical History:   Procedure Laterality Date    Cataract extraction w/  intraocular lens implant Bilateral     Eye surgery      ptosis surgery 2012, 2013    Eye surgery      strabismus surgery    Loop recorder implant      Lung  removal, partial      Remv prostate,retropub,radical      robotic assisted laparoscopic prostatectomy       FAMILY HISTORY:   Family History   Problem Relation Age of Onset    Hypertension Mother     ADHD/ADD Father        SOCIAL HISTORY:   Social History     Tobacco Use    Smoking status: Former     Current packs/day: 0.00     Types: Cigarettes     Quit date:      Years since quittin.5    Smokeless tobacco: Never   Vaping Use    Vaping status: never used   Substance Use Topics    Alcohol use: Yes     Alcohol/week: 2.0 standard drinks of alcohol     Types: 2 Cans of beer per week    Drug use: No       Drug Use:    No                ALLERGIES:   ALLERGIES:   Allergen Reactions    Gemfibrozil Other (See Comments)     Unknown     Niacin Other (See Comments)     Unknown     Sulfa Antibiotics RASH       MEDICATIONS:   Current Outpatient Medications   Medication Sig Dispense Refill    insulin glargine 100 UNIT/ML pen-injector Inject 8 Units into the skin daily. (Patient not taking: Reported on 2024)      ALPRAZolam (XANAX) 0.5 MG tablet Take 1 tablet by mouth 1 time for 1 dose. 30 minutes prior to MRI on . (Patient not taking: Reported on 2024) 1 tablet 0    ketoconazole (NIZORAL) 2 % cream APPLY CREAM EXTERNALLY ONCE DAILY FOR 20 DAYS (Patient not taking: Reported on 2024)      amLODIPine (NORVASC) 10 MG tablet Take 1 tablet by mouth daily. For blood pressure 90 tablet 3    clopidogrel (PLAVIX) 75 MG tablet Take 1 tablet by mouth daily. 90 tablet 3    atorvastatin (LIPITOR) 40 MG tablet Take 1 tablet by mouth daily. 90 tablet 3    SITagliptin (JANUVIA) 50 MG tablet Take 50 mg by mouth daily.      chlorthalidone (THALITONE) 25 MG tablet Take 25 mg by mouth daily.      metoPROLOL tartrate (LOPRESSOR) 25 MG tablet Take 25 mg by mouth every 12 hours.      BD Pen Needle Mariely U/F 32G X 4 MM Misc  (Patient not taking: Reported on 2024)      blood glucose test strip 1 each by Other route.  (Patient not taking: Reported on 6/26/2024)      pyridostigmine (MESTINON) 60 MG tablet Take 60 mg by mouth 2 times daily. Ocular Myestenia      predniSONE (DELTASONE) 10 MG tablet Take 1 tablet by mouth daily. 90 tablet 2    famotidine (PEPCID) 20 MG tablet Take 20 mg by mouth daily.       azaTHIOprine (IMURAN) 50 MG tablet Take 125 mg by mouth daily. Ocular Myestenia      aspirin (ECOTRIN) 81 MG EC tablet Take 81 mg by mouth daily.       No current facility-administered medications for this visit.     PHYSICAL  EXAMINATION  Visit Vitals  /68 (BP Location: LUE - Left upper extremity, Patient Position: Sitting, Cuff Size: Regular)   Pulse 64   Wt 66.6 kg (146 lb 13.2 oz)   BMI 22.32 kg/m²     Physical Exam  Vitals reviewed.   Constitutional:       General: He is not in acute distress.     Appearance: Normal appearance. He is well-developed. He is not diaphoretic.   HENT:      Head: Normocephalic and atraumatic.   Eyes:      Conjunctiva/sclera: Conjunctivae normal.   Neck:      Vascular: No carotid bruit or JVD.   Cardiovascular:      Rate and Rhythm: Normal rate and regular rhythm.      Pulses: Normal pulses.      Heart sounds: S1 normal and S2 normal. No murmur heard.  Pulmonary:      Effort: Pulmonary effort is normal. No respiratory distress.      Breath sounds: Normal breath sounds. No wheezing or rales.   Chest:      Chest wall: No tenderness.   Abdominal:      General: There is no distension.   Musculoskeletal:         General: Normal range of motion.      Cervical back: Normal range of motion.   Skin:     General: Skin is warm and dry.   Neurological:      Mental Status: He is alert and oriented to person, place, and time.   Psychiatric:         Mood and Affect: Mood normal.         Behavior: Behavior normal.         Labs:    Recent Labs   Lab 06/24/24  1147   Cholesterol 183      Triglycerides 76   CALCLDL 53   Non-HDL Cholesterol 68     Recent Labs   Lab 06/24/24  1147   Sodium 140   Chloride  1 105   BUN 17   Potassium 4.3   Glucose 79   Creatinine 1.60*   Calcium 9.7         IMPRESSION/PLAN:    1. Preoperative cardiovascular examination  -Preoperative Evaluation  Type of surgery: Thoracoscopy with possible thoracotomy    Active Cardiac Conditions:   Unstable Coronary Syndrome or recent MI (7 to 30 days ago) - NO  Decompensated Heart Failure - NO  Significant Arrhythmias - NO  Severe Valvular Disease - NO    Activity Level: good functional capacity (able to perform >/= 4 METS) - YES    Revised Clinical Risk Index:   Preoperative treatment with insulin - NO  History of ischemic heart disease - NO  History of cerebrovascular disease - YES  History of heart failure - NO  Pre-operative creatinine >2mg/dL - NO  Risk Index Score:   0- estimated cardiac risk 3.9%  1-estimated cardiac risk 6.0%  2-estimated cardiac risk 10.1%  3 or more- estimated cardiac risk 15%    Previous Stress Test (< 2 years): NO  Previous Revascularization (< 5 years): NO    Patient may proceed with surgical procedure at an intermediate risk for MACE.     2. Peripheral vascular disease (CMD)  -No claudication  -Continue antiplatelet therapy and statin    3. Primary hypertension  -Blood pressure is controlled on current therapy    4. Dyslipidemia  -Lipid panel reviewed, LDL at goal  -On atorvastatin    5. History of CVA (cerebrovascular accident)  -Continue antiplatelet therapy and statin      6/26/2024    Return for follow up as scheduled.    Cornelia Alejandre CNP   [facility]   Principal Discharge DX:	Viral syndrome

## 2024-07-25 NOTE — ED PROVIDER NOTE - CONSTITUTIONAL MANNER
Patient called with [facility]   Renetta Jones.  Vyvanse is not working as well as it could be and she would like to increase the dose.  She said Dr. Black told her it could be increased after 1 week.     Aayush [phone]  Mom Karla [phone]   crying/INAPPROPRIATE FOR SITUATION

## 2024-08-07 ENCOUNTER — NON-APPOINTMENT (OUTPATIENT)
Age: 39
End: 2024-08-07

## 2024-08-08 ENCOUNTER — LABORATORY RESULT (OUTPATIENT)
Age: 39
End: 2024-08-08

## 2024-08-08 ENCOUNTER — APPOINTMENT (OUTPATIENT)
Dept: OBGYN | Facility: CLINIC | Age: 39
End: 2024-08-08

## 2024-08-08 PROCEDURE — 99395 PREV VISIT EST AGE 18-39: CPT

## 2024-08-08 NOTE — HISTORY OF PRESENT ILLNESS
[FreeTextEntry1] : pt comes for annual . She has no complains . She was concerned abt the function of the iud she has had for 9 years . I gave her assurance

## 2024-08-15 ENCOUNTER — APPOINTMENT (OUTPATIENT)
Dept: OBGYN | Facility: CLINIC | Age: 39
End: 2024-08-15

## 2024-08-15 ENCOUNTER — EMERGENCY (EMERGENCY)
Facility: HOSPITAL | Age: 39
LOS: 1 days | Discharge: HOME | End: 2024-08-15
Attending: EMERGENCY MEDICINE
Payer: COMMERCIAL

## 2024-08-15 VITALS
OXYGEN SATURATION: 100 % | RESPIRATION RATE: 18 BRPM | DIASTOLIC BLOOD PRESSURE: 85 MMHG | HEART RATE: 86 BPM | HEIGHT: 65 IN | TEMPERATURE: 98 F | WEIGHT: 200.62 LBS | SYSTOLIC BLOOD PRESSURE: 146 MMHG

## 2024-08-15 VITALS
HEART RATE: 91 BPM | DIASTOLIC BLOOD PRESSURE: 92 MMHG | TEMPERATURE: 98 F | RESPIRATION RATE: 18 BRPM | OXYGEN SATURATION: 98 % | SYSTOLIC BLOOD PRESSURE: 163 MMHG

## 2024-08-15 DIAGNOSIS — Z98.890 OTHER SPECIFIED POSTPROCEDURAL STATES: Chronic | ICD-10-CM

## 2024-08-15 LAB
ANION GAP SERPL CALC-SCNC: 4 MMOL/L — LOW (ref 5–17)
BASOPHILS # BLD AUTO: 0.06 K/UL — SIGNIFICANT CHANGE UP (ref 0–0.2)
BASOPHILS NFR BLD AUTO: 0.6 % — SIGNIFICANT CHANGE UP (ref 0–2)
BUN SERPL-MCNC: 6 MG/DL — LOW (ref 7–18)
CALCIUM SERPL-MCNC: 8.7 MG/DL — SIGNIFICANT CHANGE UP (ref 8.4–10.5)
CHLORIDE SERPL-SCNC: 111 MMOL/L — HIGH (ref 96–108)
CO2 SERPL-SCNC: 26 MMOL/L — SIGNIFICANT CHANGE UP (ref 22–31)
CREAT SERPL-MCNC: 0.78 MG/DL — SIGNIFICANT CHANGE UP (ref 0.5–1.3)
EGFR: 100 ML/MIN/1.73M2 — SIGNIFICANT CHANGE UP
EOSINOPHIL # BLD AUTO: 0.22 K/UL — SIGNIFICANT CHANGE UP (ref 0–0.5)
EOSINOPHIL NFR BLD AUTO: 2.3 % — SIGNIFICANT CHANGE UP (ref 0–6)
GLUCOSE SERPL-MCNC: 91 MG/DL — SIGNIFICANT CHANGE UP (ref 70–99)
HCG SERPL-ACNC: 622 MIU/ML — HIGH
HCT VFR BLD CALC: 34.2 % — LOW (ref 34.5–45)
HGB BLD-MCNC: 11.2 G/DL — LOW (ref 11.5–15.5)
IMM GRANULOCYTES NFR BLD AUTO: 0.2 % — SIGNIFICANT CHANGE UP (ref 0–0.9)
LYMPHOCYTES # BLD AUTO: 2.87 K/UL — SIGNIFICANT CHANGE UP (ref 1–3.3)
LYMPHOCYTES # BLD AUTO: 30.2 % — SIGNIFICANT CHANGE UP (ref 13–44)
MCHC RBC-ENTMCNC: 26.7 PG — LOW (ref 27–34)
MCHC RBC-ENTMCNC: 32.7 GM/DL — SIGNIFICANT CHANGE UP (ref 32–36)
MCV RBC AUTO: 81.6 FL — SIGNIFICANT CHANGE UP (ref 80–100)
MONOCYTES # BLD AUTO: 0.64 K/UL — SIGNIFICANT CHANGE UP (ref 0–0.9)
MONOCYTES NFR BLD AUTO: 6.7 % — SIGNIFICANT CHANGE UP (ref 2–14)
NEUTROPHILS # BLD AUTO: 5.68 K/UL — SIGNIFICANT CHANGE UP (ref 1.8–7.4)
NEUTROPHILS NFR BLD AUTO: 60 % — SIGNIFICANT CHANGE UP (ref 43–77)
NRBC # BLD: 0 /100 WBCS — SIGNIFICANT CHANGE UP (ref 0–0)
PLATELET # BLD AUTO: 341 K/UL — SIGNIFICANT CHANGE UP (ref 150–400)
POTASSIUM SERPL-MCNC: 3.7 MMOL/L — SIGNIFICANT CHANGE UP (ref 3.5–5.3)
POTASSIUM SERPL-SCNC: 3.7 MMOL/L — SIGNIFICANT CHANGE UP (ref 3.5–5.3)
RBC # BLD: 4.19 M/UL — SIGNIFICANT CHANGE UP (ref 3.8–5.2)
RBC # FLD: 14.1 % — SIGNIFICANT CHANGE UP (ref 10.3–14.5)
SODIUM SERPL-SCNC: 141 MMOL/L — SIGNIFICANT CHANGE UP (ref 135–145)
WBC # BLD: 9.49 K/UL — SIGNIFICANT CHANGE UP (ref 3.8–10.5)
WBC # FLD AUTO: 9.49 K/UL — SIGNIFICANT CHANGE UP (ref 3.8–10.5)

## 2024-08-15 PROCEDURE — 76830 TRANSVAGINAL US NON-OB: CPT

## 2024-08-15 PROCEDURE — 99284 EMERGENCY DEPT VISIT MOD MDM: CPT | Mod: 25

## 2024-08-15 PROCEDURE — 36415 COLL VENOUS BLD VENIPUNCTURE: CPT

## 2024-08-15 PROCEDURE — 80048 BASIC METABOLIC PNL TOTAL CA: CPT

## 2024-08-15 PROCEDURE — 76856 US EXAM PELVIC COMPLETE: CPT

## 2024-08-15 PROCEDURE — 96374 THER/PROPH/DIAG INJ IV PUSH: CPT

## 2024-08-15 PROCEDURE — 85025 COMPLETE CBC W/AUTO DIFF WBC: CPT

## 2024-08-15 PROCEDURE — 76830 TRANSVAGINAL US NON-OB: CPT | Mod: 26

## 2024-08-15 PROCEDURE — 99285 EMERGENCY DEPT VISIT HI MDM: CPT

## 2024-08-15 PROCEDURE — 76856 US EXAM PELVIC COMPLETE: CPT | Mod: 26

## 2024-08-15 PROCEDURE — 84702 CHORIONIC GONADOTROPIN TEST: CPT

## 2024-08-15 RX ORDER — KETOROLAC TROMETHAMINE 10 MG
15 TABLET ORAL ONCE
Refills: 0 | Status: DISCONTINUED | OUTPATIENT
Start: 2024-08-15 | End: 2024-08-15

## 2024-08-15 RX ORDER — BACTERIOSTATIC SODIUM CHLORIDE 0.9 %
1000 VIAL (ML) INJECTION ONCE
Refills: 0 | Status: COMPLETED | OUTPATIENT
Start: 2024-08-15 | End: 2024-08-15

## 2024-08-15 RX ADMIN — Medication 1000 MILLILITER(S): at 20:11

## 2024-08-15 RX ADMIN — Medication 15 MILLIGRAM(S): at 20:12

## 2024-08-15 NOTE — ED ADULT NURSE NOTE - NSFALLUNIVINTERV_ED_ALL_ED
Use Enhanced Medication Counseling?: No Bed/Stretcher in lowest position, wheels locked, appropriate side rails in place/Call bell, personal items and telephone in reach/Instruct patient to call for assistance before getting out of bed/chair/stretcher/Non-slip footwear applied when patient is off stretcher/[place] to call system/Physically safe environment - no spills, clutter or unnecessary equipment/Purposeful proactive rounding/Room/bathroom lighting operational, light cord in reach

## 2024-08-15 NOTE — ED PROVIDER NOTE - NSFOLLOWUPINSTRUCTIONS_ED_ALL_ED_FT
Thank you for choosing [facility] for your healthcare.    You were seen in the Emergency Department for early pregnancy with pelvic cramping.  In the emergency room you had blood work which did confirm you are pregnant but very early on.  You had an ultrasound which could not identify if the pregnancy is in your uterus or elsewhere.  Your IUD is in the appropriate location.  Please return to the emergency department in 2 days to get the pregnancy hormone level checked to see if it is rising, staying the same or lowering as this information will help determine what the next steps need to be.  We also recommend that you call your OB/GYN's office tomorrow morning for follow-up.  If they tell you to come to the office and not return to the emergency department you can follow-up with them.  Otherwise please return to the emergency department as above.  You can also return to the emergency department sooner for severe uncontrollable pain, life-threatening vaginal bleeding or for any other concerning or emergent medical issues.

## 2024-08-15 NOTE — ED PROVIDER NOTE - CLINICAL SUMMARY MEDICAL DECISION MAKING FREE TEXT BOX
Patient reportedly HCG positive with IUD in place with pelvic cramping.  Will obtain labs, confirm pregnancy with serum HCG, US to evaluate for location of pregnancy, may need OB consult in Emergency Department.

## 2024-08-15 NOTE — ED PROVIDER NOTE - OBJECTIVE STATEMENT
Patient presenting complaining of pelvic cramping over the last four days without bleeding associated with + HCG at home.  Unclear LMP, has IUD in place and reports doesn't get menses regularly, normally does not have pain/cramping with menses.  Had HCG testing at her OBGYN's office which was reportedly positive and was send to Emergency Department for further evaluation.   Denying other symptoms. .  LMP July 10.  Patient presenting complaining of pelvic cramping over the last four days without bleeding associated with + HCG at home.  Normally does not have pain/cramping with menses.  Had HCG testing at her OBGYN's office which was reportedly positive and was send to Emergency Department for further evaluation.   Denying other symptoms.

## 2024-08-15 NOTE — ED PROVIDER NOTE - PATIENT PORTAL LINK FT
You can access the FollowMyHealth Patient Portal offered by [facility] by registering at the following website: [email or web address]. By joining [email or web address]’s FollowMyHealth portal, you will also be able to view your health information using other applications (apps) compatible with our system.

## 2024-08-15 NOTE — ED PROVIDER NOTE - PROGRESS NOTE DETAILS
[FreeTextEntry1] : Goal with no evidence of cardiopulmonary injury.  Per orthopedics Patient reporting feeling improved.  hCG 600.  Ultrasound noting IUD in appropriate location but unable to visualize location of pregnancy.  Discussed case with GYN PA and no intervention at this time.  Recommending follow-up with her outpatient OB/GYN.  Discussed with patient will recommend that she calls her OB/GYN tomorrow morning for follow-up appointment also discussed with patient should return to emergency department in 2 days for serial hCG testing if OB/GYN unable to manage in office.

## 2024-08-17 ENCOUNTER — EMERGENCY (EMERGENCY)
Facility: HOSPITAL | Age: 39
LOS: 1 days | Discharge: HOME | End: 2024-08-17
Attending: STUDENT IN AN ORGANIZED HEALTH CARE EDUCATION/TRAINING PROGRAM
Payer: COMMERCIAL

## 2024-08-17 VITALS
TEMPERATURE: 98 F | DIASTOLIC BLOOD PRESSURE: 81 MMHG | SYSTOLIC BLOOD PRESSURE: 130 MMHG | HEART RATE: 82 BPM | RESPIRATION RATE: 16 BRPM | HEIGHT: 65 IN | WEIGHT: 198.42 LBS | OXYGEN SATURATION: 97 %

## 2024-08-17 VITALS
DIASTOLIC BLOOD PRESSURE: 84 MMHG | HEART RATE: 87 BPM | RESPIRATION RATE: 18 BRPM | OXYGEN SATURATION: 100 % | SYSTOLIC BLOOD PRESSURE: 131 MMHG

## 2024-08-17 DIAGNOSIS — Z98.890 OTHER SPECIFIED POSTPROCEDURAL STATES: Chronic | ICD-10-CM

## 2024-08-17 LAB
ALBUMIN SERPL ELPH-MCNC: 3.6 G/DL — SIGNIFICANT CHANGE UP (ref 3.5–5)
ALP SERPL-CCNC: 101 U/L — SIGNIFICANT CHANGE UP (ref 40–120)
ALT FLD-CCNC: 48 U/L DA — SIGNIFICANT CHANGE UP (ref 10–60)
ANION GAP SERPL CALC-SCNC: 4 MMOL/L — LOW (ref 5–17)
APPEARANCE UR: CLEAR — SIGNIFICANT CHANGE UP
AST SERPL-CCNC: 28 U/L — SIGNIFICANT CHANGE UP (ref 10–40)
BACTERIA # UR AUTO: ABNORMAL /HPF
BASOPHILS # BLD AUTO: 0.03 K/UL — SIGNIFICANT CHANGE UP (ref 0–0.2)
BASOPHILS NFR BLD AUTO: 0.3 % — SIGNIFICANT CHANGE UP (ref 0–2)
BILIRUB SERPL-MCNC: 0.3 MG/DL — SIGNIFICANT CHANGE UP (ref 0.2–1.2)
BILIRUB UR-MCNC: NEGATIVE — SIGNIFICANT CHANGE UP
BUN SERPL-MCNC: 5 MG/DL — LOW (ref 7–18)
CALCIUM SERPL-MCNC: 8.9 MG/DL — SIGNIFICANT CHANGE UP (ref 8.4–10.5)
CHLORIDE SERPL-SCNC: 107 MMOL/L — SIGNIFICANT CHANGE UP (ref 96–108)
CO2 SERPL-SCNC: 24 MMOL/L — SIGNIFICANT CHANGE UP (ref 22–31)
COLOR SPEC: YELLOW — SIGNIFICANT CHANGE UP
CREAT SERPL-MCNC: 0.65 MG/DL — SIGNIFICANT CHANGE UP (ref 0.5–1.3)
DIFF PNL FLD: ABNORMAL
EGFR: 116 ML/MIN/1.73M2 — SIGNIFICANT CHANGE UP
EOSINOPHIL # BLD AUTO: 0.18 K/UL — SIGNIFICANT CHANGE UP (ref 0–0.5)
EOSINOPHIL NFR BLD AUTO: 1.7 % — SIGNIFICANT CHANGE UP (ref 0–6)
EPI CELLS # UR: PRESENT
GLUCOSE SERPL-MCNC: 98 MG/DL — SIGNIFICANT CHANGE UP (ref 70–99)
GLUCOSE UR QL: NEGATIVE MG/DL — SIGNIFICANT CHANGE UP
HCG SERPL-ACNC: 1432 MIU/ML — HIGH
HCT VFR BLD CALC: 35.5 % — SIGNIFICANT CHANGE UP (ref 34.5–45)
HGB BLD-MCNC: 11.7 G/DL — SIGNIFICANT CHANGE UP (ref 11.5–15.5)
IMM GRANULOCYTES NFR BLD AUTO: 0.5 % — SIGNIFICANT CHANGE UP (ref 0–0.9)
KETONES UR-MCNC: NEGATIVE MG/DL — SIGNIFICANT CHANGE UP
LEUKOCYTE ESTERASE UR-ACNC: ABNORMAL
LYMPHOCYTES # BLD AUTO: 2.26 K/UL — SIGNIFICANT CHANGE UP (ref 1–3.3)
LYMPHOCYTES # BLD AUTO: 21.5 % — SIGNIFICANT CHANGE UP (ref 13–44)
MCHC RBC-ENTMCNC: 26.9 PG — LOW (ref 27–34)
MCHC RBC-ENTMCNC: 33 GM/DL — SIGNIFICANT CHANGE UP (ref 32–36)
MCV RBC AUTO: 81.6 FL — SIGNIFICANT CHANGE UP (ref 80–100)
MONOCYTES # BLD AUTO: 0.65 K/UL — SIGNIFICANT CHANGE UP (ref 0–0.9)
MONOCYTES NFR BLD AUTO: 6.2 % — SIGNIFICANT CHANGE UP (ref 2–14)
NEUTROPHILS # BLD AUTO: 7.33 K/UL — SIGNIFICANT CHANGE UP (ref 1.8–7.4)
NEUTROPHILS NFR BLD AUTO: 69.8 % — SIGNIFICANT CHANGE UP (ref 43–77)
NITRITE UR-MCNC: NEGATIVE — SIGNIFICANT CHANGE UP
NRBC # BLD: 0 /100 WBCS — SIGNIFICANT CHANGE UP (ref 0–0)
PH UR: 6.5 — SIGNIFICANT CHANGE UP (ref 5–8)
PLATELET # BLD AUTO: 356 K/UL — SIGNIFICANT CHANGE UP (ref 150–400)
POTASSIUM SERPL-MCNC: 3.5 MMOL/L — SIGNIFICANT CHANGE UP (ref 3.5–5.3)
POTASSIUM SERPL-SCNC: 3.5 MMOL/L — SIGNIFICANT CHANGE UP (ref 3.5–5.3)
PROT SERPL-MCNC: 7.7 G/DL — SIGNIFICANT CHANGE UP (ref 6–8.3)
PROT UR-MCNC: NEGATIVE MG/DL — SIGNIFICANT CHANGE UP
RBC # BLD: 4.35 M/UL — SIGNIFICANT CHANGE UP (ref 3.8–5.2)
RBC # FLD: 14.1 % — SIGNIFICANT CHANGE UP (ref 10.3–14.5)
RBC CASTS # UR COMP ASSIST: 5 /HPF — HIGH (ref 0–4)
SODIUM SERPL-SCNC: 135 MMOL/L — SIGNIFICANT CHANGE UP (ref 135–145)
SP GR SPEC: 1.01 — SIGNIFICANT CHANGE UP (ref 1–1.03)
UROBILINOGEN FLD QL: 0.2 MG/DL — SIGNIFICANT CHANGE UP (ref 0.2–1)
WBC # BLD: 10.5 K/UL — SIGNIFICANT CHANGE UP (ref 3.8–10.5)
WBC # FLD AUTO: 10.5 K/UL — SIGNIFICANT CHANGE UP (ref 3.8–10.5)
WBC UR QL: 7 /HPF — HIGH (ref 0–5)

## 2024-08-17 PROCEDURE — 99284 EMERGENCY DEPT VISIT MOD MDM: CPT

## 2024-08-17 PROCEDURE — 84702 CHORIONIC GONADOTROPIN TEST: CPT

## 2024-08-17 PROCEDURE — 36415 COLL VENOUS BLD VENIPUNCTURE: CPT

## 2024-08-17 PROCEDURE — 76817 TRANSVAGINAL US OBSTETRIC: CPT | Mod: 26

## 2024-08-17 PROCEDURE — 99284 EMERGENCY DEPT VISIT MOD MDM: CPT | Mod: 25

## 2024-08-17 PROCEDURE — 81001 URINALYSIS AUTO W/SCOPE: CPT

## 2024-08-17 PROCEDURE — 85025 COMPLETE CBC W/AUTO DIFF WBC: CPT

## 2024-08-17 PROCEDURE — 80053 COMPREHEN METABOLIC PANEL: CPT

## 2024-08-17 PROCEDURE — 76801 OB US < 14 WKS SINGLE FETUS: CPT

## 2024-08-17 PROCEDURE — 76801 OB US < 14 WKS SINGLE FETUS: CPT | Mod: 26

## 2024-08-17 PROCEDURE — 76817 TRANSVAGINAL US OBSTETRIC: CPT

## 2024-08-17 PROCEDURE — 87086 URINE CULTURE/COLONY COUNT: CPT

## 2024-08-17 RX ORDER — ACETAMINOPHEN 500 MG
1000 TABLET ORAL ONCE
Refills: 0 | Status: COMPLETED | OUTPATIENT
Start: 2024-08-17 | End: 2024-08-17

## 2024-08-17 RX ORDER — BACTERIOSTATIC SODIUM CHLORIDE 0.9 %
1000 VIAL (ML) INJECTION ONCE
Refills: 0 | Status: COMPLETED | OUTPATIENT
Start: 2024-08-17 | End: 2024-08-17

## 2024-08-17 RX ORDER — ACETAMINOPHEN 500 MG
3 TABLET ORAL
Qty: 63 | Refills: 0
Start: 2024-08-17 | End: 2024-08-23

## 2024-08-17 RX ORDER — CEPHALEXIN 250 MG
1 CAPSULE ORAL
Qty: 21 | Refills: 0
Start: 2024-08-17 | End: 2024-08-23

## 2024-08-17 RX ADMIN — Medication 1000 MILLILITER(S): at 17:37

## 2024-08-17 NOTE — ED PROVIDER NOTE - PHYSICAL EXAMINATION
Gen: NAD, AOx3, able to make needs known, non-toxic  Head: NCAT  HEENT: EOMI, oral mucosa moist, normal conjunctiva  Lung: CTAB, no respiratory distress, no wheezes/rhonchi/rales B/L, speaking in full sentences  CV: RRR, no murmurs  Abd:  +mild LLQ ttp, non distended, soft,  no guarding, no CVA tenderness  MSK: no visible deformities  Neuro: Appears non focal  Skin: Warm, well perfused, no rash  Psych: normal affect
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
## 2024-08-17 NOTE — ED PROVIDER NOTE - PATIENT PORTAL LINK FT
You can access the FollowMyHealth Patient Portal offered by [facility] by registering at the following website: [email or web address]. By joining [employer]’s FollowMyHealth portal, you will also be able to view your health information using other applications (apps) compatible with our system.
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
## 2024-08-17 NOTE — ED PROVIDER NOTE - NSFOLLOWUPINSTRUCTIONS_ED_ALL_ED_FT
1) Follow up with your OBGYN for your appointment on Tuesday. If you are unable to you should return to the ED for repeat blood work and ultrasound.   2) Return to the ED immediately for new or worsening symptoms abdominal pain, fever, vaginal bleeding, vomiting  3) Please continue to take any home medications as prescribed  4) Your test results from your ED visit were discussed with you prior to discharge  5) You were provided with a copy of your test results

## 2024-08-17 NOTE — ED PROVIDER NOTE - PROGRESS NOTE DETAILS
Labs, hCG noted, ultrasound without IUP, noted IUD.  Patient states she has an appointment with her OB/GYN on Tuesday.  Given ectopic precautions.  Red flag signs and symptoms discussed as well as strict return precautions given patient verbalized understanding. Labs, hCG noted, ultrasound without IUP, noted IUD.  Will treat UA.  Patient states she has an appointment with her OB/GYN on Tuesday.  Given ectopic precautions.  Red flag signs and symptoms discussed as well as strict return precautions given patient verbalized understanding.
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
## 2024-08-18 LAB
CULTURE RESULTS: SIGNIFICANT CHANGE UP
SPECIMEN SOURCE: SIGNIFICANT CHANGE UP

## 2024-08-19 ENCOUNTER — EMERGENCY (EMERGENCY)
Facility: HOSPITAL | Age: 39
LOS: 1 days | Discharge: HOME | End: 2024-08-19
Attending: EMERGENCY MEDICINE
Payer: COMMERCIAL

## 2024-08-19 VITALS
HEART RATE: 75 BPM | DIASTOLIC BLOOD PRESSURE: 92 MMHG | RESPIRATION RATE: 18 BRPM | HEIGHT: 65 IN | TEMPERATURE: 99 F | WEIGHT: 199.96 LBS | SYSTOLIC BLOOD PRESSURE: 147 MMHG | OXYGEN SATURATION: 99 %

## 2024-08-19 VITALS
RESPIRATION RATE: 16 BRPM | HEART RATE: 73 BPM | SYSTOLIC BLOOD PRESSURE: 131 MMHG | DIASTOLIC BLOOD PRESSURE: 88 MMHG | OXYGEN SATURATION: 99 % | TEMPERATURE: 98 F

## 2024-08-19 DIAGNOSIS — Z98.890 OTHER SPECIFIED POSTPROCEDURAL STATES: Chronic | ICD-10-CM

## 2024-08-19 LAB
ALBUMIN SERPL ELPH-MCNC: 3.6 G/DL — SIGNIFICANT CHANGE UP (ref 3.5–5)
ALP SERPL-CCNC: 107 U/L — SIGNIFICANT CHANGE UP (ref 40–120)
ALT FLD-CCNC: 54 U/L DA — SIGNIFICANT CHANGE UP (ref 10–60)
ANION GAP SERPL CALC-SCNC: 7 MMOL/L — SIGNIFICANT CHANGE UP (ref 5–17)
APPEARANCE UR: CLEAR — SIGNIFICANT CHANGE UP
AST SERPL-CCNC: 36 U/L — SIGNIFICANT CHANGE UP (ref 10–40)
BASOPHILS # BLD AUTO: 0.06 K/UL — SIGNIFICANT CHANGE UP (ref 0–0.2)
BASOPHILS NFR BLD AUTO: 0.6 % — SIGNIFICANT CHANGE UP (ref 0–2)
BILIRUB SERPL-MCNC: 0.3 MG/DL — SIGNIFICANT CHANGE UP (ref 0.2–1.2)
BILIRUB UR-MCNC: NEGATIVE — SIGNIFICANT CHANGE UP
BUN SERPL-MCNC: 10 MG/DL — SIGNIFICANT CHANGE UP (ref 7–18)
CALCIUM SERPL-MCNC: 8.9 MG/DL — SIGNIFICANT CHANGE UP (ref 8.4–10.5)
CHLORIDE SERPL-SCNC: 106 MMOL/L — SIGNIFICANT CHANGE UP (ref 96–108)
CO2 SERPL-SCNC: 23 MMOL/L — SIGNIFICANT CHANGE UP (ref 22–31)
COLOR SPEC: YELLOW — SIGNIFICANT CHANGE UP
CREAT SERPL-MCNC: 0.76 MG/DL — SIGNIFICANT CHANGE UP (ref 0.5–1.3)
DIFF PNL FLD: ABNORMAL
EGFR: 103 ML/MIN/1.73M2 — SIGNIFICANT CHANGE UP
EOSINOPHIL # BLD AUTO: 0.11 K/UL — SIGNIFICANT CHANGE UP (ref 0–0.5)
EOSINOPHIL NFR BLD AUTO: 1 % — SIGNIFICANT CHANGE UP (ref 0–6)
GLUCOSE SERPL-MCNC: 91 MG/DL — SIGNIFICANT CHANGE UP (ref 70–99)
GLUCOSE UR QL: NEGATIVE MG/DL — SIGNIFICANT CHANGE UP
HCG SERPL-ACNC: 2162 MIU/ML — HIGH
HCT VFR BLD CALC: 36.8 % — SIGNIFICANT CHANGE UP (ref 34.5–45)
HGB BLD-MCNC: 11.9 G/DL — SIGNIFICANT CHANGE UP (ref 11.5–15.5)
IMM GRANULOCYTES NFR BLD AUTO: 0.3 % — SIGNIFICANT CHANGE UP (ref 0–0.9)
KETONES UR-MCNC: NEGATIVE MG/DL — SIGNIFICANT CHANGE UP
LEUKOCYTE ESTERASE UR-ACNC: ABNORMAL
LIDOCAIN IGE QN: 53 U/L — SIGNIFICANT CHANGE UP (ref 13–75)
LYMPHOCYTES # BLD AUTO: 2.7 K/UL — SIGNIFICANT CHANGE UP (ref 1–3.3)
LYMPHOCYTES # BLD AUTO: 25.4 % — SIGNIFICANT CHANGE UP (ref 13–44)
MCHC RBC-ENTMCNC: 26.7 PG — LOW (ref 27–34)
MCHC RBC-ENTMCNC: 32.3 GM/DL — SIGNIFICANT CHANGE UP (ref 32–36)
MCV RBC AUTO: 82.5 FL — SIGNIFICANT CHANGE UP (ref 80–100)
MONOCYTES # BLD AUTO: 0.66 K/UL — SIGNIFICANT CHANGE UP (ref 0–0.9)
MONOCYTES NFR BLD AUTO: 6.2 % — SIGNIFICANT CHANGE UP (ref 2–14)
NEUTROPHILS # BLD AUTO: 7.09 K/UL — SIGNIFICANT CHANGE UP (ref 1.8–7.4)
NEUTROPHILS NFR BLD AUTO: 66.5 % — SIGNIFICANT CHANGE UP (ref 43–77)
NITRITE UR-MCNC: NEGATIVE — SIGNIFICANT CHANGE UP
NRBC # BLD: 0 /100 WBCS — SIGNIFICANT CHANGE UP (ref 0–0)
PH UR: 7 — SIGNIFICANT CHANGE UP (ref 5–8)
PLATELET # BLD AUTO: 346 K/UL — SIGNIFICANT CHANGE UP (ref 150–400)
POTASSIUM SERPL-MCNC: 3.9 MMOL/L — SIGNIFICANT CHANGE UP (ref 3.5–5.3)
POTASSIUM SERPL-SCNC: 3.9 MMOL/L — SIGNIFICANT CHANGE UP (ref 3.5–5.3)
PROT SERPL-MCNC: 8 G/DL — SIGNIFICANT CHANGE UP (ref 6–8.3)
PROT UR-MCNC: NEGATIVE MG/DL — SIGNIFICANT CHANGE UP
RBC # BLD: 4.46 M/UL — SIGNIFICANT CHANGE UP (ref 3.8–5.2)
RBC # FLD: 14.3 % — SIGNIFICANT CHANGE UP (ref 10.3–14.5)
SODIUM SERPL-SCNC: 136 MMOL/L — SIGNIFICANT CHANGE UP (ref 135–145)
SP GR SPEC: 1.02 — SIGNIFICANT CHANGE UP (ref 1–1.03)
UROBILINOGEN FLD QL: 0.2 MG/DL — SIGNIFICANT CHANGE UP (ref 0.2–1)
WBC # BLD: 10.65 K/UL — HIGH (ref 3.8–10.5)
WBC # FLD AUTO: 10.65 K/UL — HIGH (ref 3.8–10.5)

## 2024-08-19 PROCEDURE — 86901 BLOOD TYPING SEROLOGIC RH(D): CPT

## 2024-08-19 PROCEDURE — 85025 COMPLETE CBC W/AUTO DIFF WBC: CPT

## 2024-08-19 PROCEDURE — 84702 CHORIONIC GONADOTROPIN TEST: CPT

## 2024-08-19 PROCEDURE — 86900 BLOOD TYPING SEROLOGIC ABO: CPT

## 2024-08-19 PROCEDURE — 36415 COLL VENOUS BLD VENIPUNCTURE: CPT

## 2024-08-19 PROCEDURE — 81001 URINALYSIS AUTO W/SCOPE: CPT

## 2024-08-19 PROCEDURE — 99285 EMERGENCY DEPT VISIT HI MDM: CPT

## 2024-08-19 PROCEDURE — 80053 COMPREHEN METABOLIC PANEL: CPT

## 2024-08-19 PROCEDURE — 83690 ASSAY OF LIPASE: CPT

## 2024-08-19 PROCEDURE — 86850 RBC ANTIBODY SCREEN: CPT

## 2024-08-19 PROCEDURE — 99284 EMERGENCY DEPT VISIT MOD MDM: CPT | Mod: 25

## 2024-08-19 NOTE — ED ADULT NURSE NOTE - OBJECTIVE STATEMENT
Patient arrived to ED c/o stated she thinks she is 5-6 weeks pregnant, noticed blood on tissue after she urinated, denies having any pain at this time or seeing blood in the urine

## 2024-08-19 NOTE — ED PROVIDER NOTE - NSFOLLOWUPINSTRUCTIONS_ED_ALL_ED_FT
Vaginal Bleeding During Pregnancy, First Trimester  A small amount of bleeding from the vagina, or spotting, is common during early pregnancy. Some bleeding may be related to the pregnancy, and some may not. In many cases, the bleeding is normal and is not a problem. However, bleeding can also be a sign of something serious.    Normal things that may cause bleeding during the first trimester:  Implantation of the fertilized egg in the lining of the uterus.  Rapid changes in blood vessels. This is caused by changes that are happening to the body during pregnancy.  Sex.  Pelvic exams.  Abnormal things that may cause bleeding during the first trimester include:  Infection or inflammation of the cervix.  Growths or polyps on the cervix.  Miscarriage or threatened miscarriage.  Pregnancy that is growing outside of the uterus (ectopic pregnancy).  A fertilized egg that becomes a mass of tissue (molar pregnancy).  Tell your health care provider right away if there is any bleeding from your vagina.    Follow these instructions at home:  Monitoring your bleeding      Monitor your bleeding.  Pay attention to any changes in your symptoms. Let your health care provider know about any concerns.  Try to understand when the bleeding occurs. Does the bleeding start on its own, or does it start after something is done, such as sex or a pelvic exam?  Use a diary to record the things you see about your bleeding, including:  The kind of bleeding you are having. Does the bleeding start and stop irregularly, or is it a constant flow?  The severity of your bleeding. Is the bleeding heavy or light?  The number of pads you use each day, how often you change them, and how soaked they are.  Tell your health care provider if you pass tissue. He or she may want to see it.  Activity    Follow instructions from your health care provider about limiting your activity. Ask what activities are safe for you.  Do not have sex until your health care provider says that this is safe.  If needed, make plans for someone to help with your regular activities.  General instructions    Take over-the-counter and prescription medicines only as told by your health care provider.  Do not take aspirin because it can cause bleeding.  Do not use tampons or douche.  Keep all follow-up visits. This is important.  Contact a health care provider if:  You have vaginal bleeding during any part of your pregnancy.  You have cramps or labor pains.  You have a fever or chills.  Get help right away if:  You have severe cramps in your back or abdomen.  You pass large clots or a large amount of tissue from your vagina.  Your bleeding increases.  You feel light-headed or weak, or you faint.  You are leaking fluid or have a gush of fluid from your vagina.  Summary  A small amount of bleeding from the vagina is common during early pregnancy.  Be sure to tell your health care provider about any vaginal bleeding right away.  Try to understand when bleeding occurs. Does bleeding occur on its own, or does it occur after something is done, such as sex or pelvic exams?  Keep all follow-up visits. This is important.  This information is not intended to replace advice given to you by your health care provider. Make sure you discuss any questions you have with your health care provider.

## 2024-08-19 NOTE — ED PROVIDER NOTE - CARE PROVIDER_API CALL
Rodney Peter  Obstetrics and Gynecology  [address] [place] [address], [address] [place] [address]  Phone: [phone]  Fax: [phone]  Follow Up Time:

## 2024-08-19 NOTE — ED ADULT TRIAGE NOTE - CHIEF COMPLAINT QUOTE
Pt stated she thinks she is 5-6 weeks pregnant, noticed blood on tissue after she urinated, denies having any pain at this time or seeing blood in the urine

## 2024-08-19 NOTE — ED PROVIDER NOTE - PATIENT PORTAL LINK FT
You can access the FollowMyHealth Patient Portal offered by [facility] by registering at the following website: [email or web address]. By joining [facility]’s FollowMyHealth portal, you will also be able to view your health information using other applications (apps) compatible with our system.

## 2024-08-19 NOTE — ED PROVIDER NOTE - CLINICAL SUMMARY MEDICAL DECISION MAKING FREE TEXT BOX
Beta quant today is 2162.  Patient no acute distress.  No intrauterine gestational sac/yolk sac reported on ultrasound on August 17.  OB/GYN service endorsed and will evaluate  patient. Beta quant today is 2162.  Patient no acute distress.  No intrauterine gestational sac/yolk sac reported on ultrasound on August 17.  OB/GYN service endorsed and will evaluate  patient.  3:45 AM patient evaluated by OB/GYN PA informed patient stable for discharge and will follow-up with her OB/GYN attending Dr. Peter in am.  Pt is well appearing, has no new complaints and able to walk with normal gait. Pt is stable for discharge and follow up with medical doctor. Pt educated on care and need for follow up. Discussed anticipatory guidance and return precautions. Questions answered. I had a detailed discussion with the patient regarding the historical points, exam findings, and any diagnostic results supporting the discharge diagnosis.

## 2024-08-19 NOTE — CONSULT NOTE ADULT - ASSESSMENT
A/p: 37 yo  estimated @ 5w6d presents for pregnancy of unknown location, pt stable    - Labs and vitals reviewed  - Previous ED visit reviewed with pt   - Ectopic, bleeding and torsion precautions discussed with patient   - Pt has scheduled visit with OB tomorrow, Pt advised that strict follow up in necessary  - No acute gyn intervention at this time    -d/w Dr. Theodore

## 2024-08-19 NOTE — ED PROVIDER NOTE - OBJECTIVE STATEMENT
37 yo F  LMP 7/10. .  Pt noted blood upon wiping after urinating at 8 30pm and just prior to arrival.  No abominable pain. Patient was seen in ED on August 17 beta quant at that time 1432.  Ultrasound reported no intrauterine gestational sac or yolk sac.  Patient blood type is O+.
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
## 2024-08-20 ENCOUNTER — APPOINTMENT (OUTPATIENT)
Dept: OBGYN | Facility: CLINIC | Age: 39
End: 2024-08-20
Payer: COMMERCIAL

## 2024-08-20 VITALS — BODY MASS INDEX: 36.58 KG/M2 | DIASTOLIC BLOOD PRESSURE: 81 MMHG | WEIGHT: 200 LBS | SYSTOLIC BLOOD PRESSURE: 119 MMHG

## 2024-08-20 DIAGNOSIS — Z33.1 BREAKDOWN (MECHANICAL) OF INTRAUTERINE CONTRACEPTIVE DEVICE, INITIAL ENCOUNTER: ICD-10-CM

## 2024-08-20 DIAGNOSIS — Z34.90 ENCOUNTER FOR SUPERVISION OF NORMAL PREGNANCY, UNSPECIFIED, UNSPECIFIED TRIMESTER: ICD-10-CM

## 2024-08-20 DIAGNOSIS — T83.31XA BREAKDOWN (MECHANICAL) OF INTRAUTERINE CONTRACEPTIVE DEVICE, INITIAL ENCOUNTER: ICD-10-CM

## 2024-08-20 PROCEDURE — 99214 OFFICE O/P EST MOD 30 MIN: CPT

## 2024-08-20 RX ORDER — PRENATAL WITH FERROUS FUM AND FOLIC ACID 3080; 920; 120; 400; 22; 1.84; 3; 20; 10; 1; 12; 200; 27; 25; 2 [IU]/1; [IU]/1; MG/1; [IU]/1; MG/1; MG/1; MG/1; MG/1; MG/1; MG/1; UG/1; MG/1; MG/1; MG/1; MG/1
27-1 TABLET ORAL DAILY
Qty: 30 | Refills: 4 | Status: ACTIVE | COMMUNITY
Start: 2024-08-20 | End: 1900-01-01

## 2024-08-20 NOTE — PHYSICAL EXAM
[Labia Majora] : normal [Labia Minora] : normal [Normal] : normal [Uterine Adnexae] : normal [FreeTextEntry5] : IUD strings NOT seen

## 2024-08-20 NOTE — HISTORY OF PRESENT ILLNESS
[FreeTextEntry1] : patient is here for a positive pregnancy test hcg kindda plateau from 3896-5514  pelvic sono doesnt show IUP  pt has a copper IUD in place

## 2024-08-21 ENCOUNTER — EMERGENCY (EMERGENCY)
Facility: HOSPITAL | Age: 39
LOS: 1 days | Discharge: HOME | End: 2024-08-21
Attending: EMERGENCY MEDICINE | Admitting: EMERGENCY MEDICINE
Payer: COMMERCIAL

## 2024-08-21 VITALS
OXYGEN SATURATION: 100 % | DIASTOLIC BLOOD PRESSURE: 66 MMHG | SYSTOLIC BLOOD PRESSURE: 130 MMHG | HEART RATE: 70 BPM | TEMPERATURE: 98 F | RESPIRATION RATE: 18 BRPM

## 2024-08-21 VITALS
HEIGHT: 65 IN | TEMPERATURE: 98 F | HEART RATE: 82 BPM | OXYGEN SATURATION: 99 % | WEIGHT: 160.06 LBS | SYSTOLIC BLOOD PRESSURE: 158 MMHG | DIASTOLIC BLOOD PRESSURE: 97 MMHG | RESPIRATION RATE: 18 BRPM

## 2024-08-21 DIAGNOSIS — Z98.890 OTHER SPECIFIED POSTPROCEDURAL STATES: Chronic | ICD-10-CM

## 2024-08-21 LAB
ALBUMIN SERPL ELPH-MCNC: 3.6 G/DL — SIGNIFICANT CHANGE UP (ref 3.3–5)
ALP SERPL-CCNC: 87 U/L — SIGNIFICANT CHANGE UP (ref 40–120)
ALT FLD-CCNC: 37 U/L — HIGH (ref 4–33)
ANION GAP SERPL CALC-SCNC: 12 MMOL/L — SIGNIFICANT CHANGE UP (ref 7–14)
APPEARANCE UR: CLEAR — SIGNIFICANT CHANGE UP
AST SERPL-CCNC: 28 U/L — SIGNIFICANT CHANGE UP (ref 4–32)
BACTERIA # UR AUTO: NEGATIVE /HPF — SIGNIFICANT CHANGE UP
BASOPHILS # BLD AUTO: 0.04 K/UL — SIGNIFICANT CHANGE UP (ref 0–0.2)
BASOPHILS NFR BLD AUTO: 0.5 % — SIGNIFICANT CHANGE UP (ref 0–2)
BILIRUB SERPL-MCNC: <0.2 MG/DL — SIGNIFICANT CHANGE UP (ref 0.2–1.2)
BILIRUB UR-MCNC: NEGATIVE — SIGNIFICANT CHANGE UP
BLD GP AB SCN SERPL QL: NEGATIVE — SIGNIFICANT CHANGE UP
BUN SERPL-MCNC: 9 MG/DL — SIGNIFICANT CHANGE UP (ref 7–23)
CALCIUM SERPL-MCNC: 9.3 MG/DL — SIGNIFICANT CHANGE UP (ref 8.4–10.5)
CAST: 0 /LPF — SIGNIFICANT CHANGE UP (ref 0–4)
CHLORIDE SERPL-SCNC: 102 MMOL/L — SIGNIFICANT CHANGE UP (ref 98–107)
CO2 SERPL-SCNC: 20 MMOL/L — LOW (ref 22–31)
COLOR SPEC: YELLOW — SIGNIFICANT CHANGE UP
CREAT SERPL-MCNC: 0.58 MG/DL — SIGNIFICANT CHANGE UP (ref 0.5–1.3)
DIFF PNL FLD: ABNORMAL
EGFR: 119 ML/MIN/1.73M2 — SIGNIFICANT CHANGE UP
EOSINOPHIL # BLD AUTO: 0.28 K/UL — SIGNIFICANT CHANGE UP (ref 0–0.5)
EOSINOPHIL NFR BLD AUTO: 3.2 % — SIGNIFICANT CHANGE UP (ref 0–6)
GLUCOSE SERPL-MCNC: 89 MG/DL — SIGNIFICANT CHANGE UP (ref 70–99)
GLUCOSE UR QL: NEGATIVE MG/DL — SIGNIFICANT CHANGE UP
HCG SERPL-ACNC: 3573 MIU/ML — SIGNIFICANT CHANGE UP
HCT VFR BLD CALC: 32.7 % — LOW (ref 34.5–45)
HGB BLD-MCNC: 10.7 G/DL — LOW (ref 11.5–15.5)
IANC: 4.73 K/UL — SIGNIFICANT CHANGE UP (ref 1.8–7.4)
IMM GRANULOCYTES NFR BLD AUTO: 0.2 % — SIGNIFICANT CHANGE UP (ref 0–0.9)
KETONES UR-MCNC: NEGATIVE MG/DL — SIGNIFICANT CHANGE UP
LEUKOCYTE ESTERASE UR-ACNC: ABNORMAL
LYMPHOCYTES # BLD AUTO: 2.89 K/UL — SIGNIFICANT CHANGE UP (ref 1–3.3)
LYMPHOCYTES # BLD AUTO: 33.5 % — SIGNIFICANT CHANGE UP (ref 13–44)
MCHC RBC-ENTMCNC: 26.6 PG — LOW (ref 27–34)
MCHC RBC-ENTMCNC: 32.7 GM/DL — SIGNIFICANT CHANGE UP (ref 32–36)
MCV RBC AUTO: 81.1 FL — SIGNIFICANT CHANGE UP (ref 80–100)
MONOCYTES # BLD AUTO: 0.67 K/UL — SIGNIFICANT CHANGE UP (ref 0–0.9)
MONOCYTES NFR BLD AUTO: 7.8 % — SIGNIFICANT CHANGE UP (ref 2–14)
NEUTROPHILS # BLD AUTO: 4.73 K/UL — SIGNIFICANT CHANGE UP (ref 1.8–7.4)
NEUTROPHILS NFR BLD AUTO: 54.8 % — SIGNIFICANT CHANGE UP (ref 43–77)
NITRITE UR-MCNC: NEGATIVE — SIGNIFICANT CHANGE UP
NRBC # BLD: 0 /100 WBCS — SIGNIFICANT CHANGE UP (ref 0–0)
NRBC # FLD: 0 K/UL — SIGNIFICANT CHANGE UP (ref 0–0)
PH UR: 6.5 — SIGNIFICANT CHANGE UP (ref 5–8)
PLATELET # BLD AUTO: 321 K/UL — SIGNIFICANT CHANGE UP (ref 150–400)
POTASSIUM SERPL-MCNC: 3.8 MMOL/L — SIGNIFICANT CHANGE UP (ref 3.5–5.3)
POTASSIUM SERPL-SCNC: 3.8 MMOL/L — SIGNIFICANT CHANGE UP (ref 3.5–5.3)
PROT SERPL-MCNC: 6.9 G/DL — SIGNIFICANT CHANGE UP (ref 6–8.3)
PROT UR-MCNC: NEGATIVE MG/DL — SIGNIFICANT CHANGE UP
RBC # BLD: 4.03 M/UL — SIGNIFICANT CHANGE UP (ref 3.8–5.2)
RBC # FLD: 14.2 % — SIGNIFICANT CHANGE UP (ref 10.3–14.5)
RBC CASTS # UR COMP ASSIST: 5 /HPF — HIGH (ref 0–4)
RH IG SCN BLD-IMP: POSITIVE — SIGNIFICANT CHANGE UP
SODIUM SERPL-SCNC: 134 MMOL/L — LOW (ref 135–145)
SP GR SPEC: 1.02 — SIGNIFICANT CHANGE UP (ref 1–1.03)
SQUAMOUS # UR AUTO: 5 /HPF — SIGNIFICANT CHANGE UP (ref 0–5)
UROBILINOGEN FLD QL: 0.2 MG/DL — SIGNIFICANT CHANGE UP (ref 0.2–1)
WBC # BLD: 8.63 K/UL — SIGNIFICANT CHANGE UP (ref 3.8–10.5)
WBC # FLD AUTO: 8.63 K/UL — SIGNIFICANT CHANGE UP (ref 3.8–10.5)
WBC UR QL: 1 /HPF — SIGNIFICANT CHANGE UP (ref 0–5)

## 2024-08-21 PROCEDURE — 99285 EMERGENCY DEPT VISIT HI MDM: CPT

## 2024-08-21 PROCEDURE — 76817 TRANSVAGINAL US OBSTETRIC: CPT | Mod: 26

## 2024-08-21 PROCEDURE — 99283 EMERGENCY DEPT VISIT LOW MDM: CPT | Mod: GC

## 2024-08-21 NOTE — ED PROVIDER NOTE - PHYSICAL EXAMINATION
GEN: NAD, awake, eyes open spontaneously  EYES: normal conjunctiva, perrl  ENT: NCAT, MMM, Trachea midline  CHEST/LUNGS: Non-tachypneic, CTAB, bilateral breath sounds  CARDIAC: Non-tachycardic, normal perfusion  ABDOMEN: Midline surgical incision. Soft, NTND, No rebound/guarding  MSK: No edema, no gross deformity of extremities  SKIN: No rashes, no petechiae, no vesicles  NEURO: CN grossly intact, normal coordination, no focal motor or sensory deficits  PSYCH: Alert, appropriate, cooperative, with capacity and insight GEN: NAD, awake, eyes open spontaneously  EYES: normal conjunctiva, perrl  ENT: NCAT, MMM, Trachea midline  CHEST/LUNGS: Non-tachypneic, CTAB, bilateral breath sounds  CARDIAC: Non-tachycardic, normal perfusion  ABDOMEN: Midline surgical incision. Soft, NTND, No rebound/guarding  : Vaginal exam with no blood in vaginal vault. physiologic discharge present. closed os.   MSK: No edema, no gross deformity of extremities  SKIN: No rashes, no petechiae, no vesicles  NEURO: CN grossly intact, normal coordination, no focal motor or sensory deficits  PSYCH: Alert, appropriate, cooperative, with capacity and insight

## 2024-08-21 NOTE — CONSULT NOTE ADULT - ASSESSMENT
39 y/o , LMP 7/10, presenting with known PUL with increasing bHCG level of 3573 and Paragard IUD in place. TVUS shows gestational sac without yolk sac or fetal pole and adnexa were not well visualized. Pelvic Exam revealed no vaginal bleeding and no IUD strings visualized. Differential includes viable IUP, failed pregnancy/miscarriage or ectopic pregnancy.     - Discussed findings with patient. Discussed importance of close follow-up with repeat HCG level in 48 hours. Discussed ectopic precautions.  - IUD could not be removed as strings were not well visualized. Discussed with patient that given PUL, instrumentation should not be used inside uterus unless location of pregnancy is confirmed to be ectopic or viable IUP. Depending on findings and location of IUD, procedure can be done to remove IUD. Discussed that removal of IUD may also disrupt viable IUP. Discussed that IUD in place puts her at high risk for ectopic pregnancy which is why close follow up is very important. Discussed that IUD also increases risk of pregnancy failure.  - Patient to follow up in 48 hours for repeat bHCG and TVUS. Patient encouraged to follow up with Private GYN but if she is unable to, then she should present to the ED for repeat workup.   - patient to be added to GYN team Beta list     Simran Oro PGY2  d/w Dr. Morales

## 2024-08-21 NOTE — ED ADULT TRIAGE NOTE - CHIEF COMPLAINT QUOTE
Patient approximately 6 weeks pregnant, , c/o spotting x 1 day. Has IUD in place. Denies abdominal pain and N/V. No hx.

## 2024-08-21 NOTE — ED PROVIDER NOTE - OBJECTIVE STATEMENT
38F  LMP 7/10/24 presenting with vaginal spotting starting today. She notices the blood when she wipes after urinating. Patient reports positive home pregnancy test on 24 and was evaluated at [facility] on the , , and  for similar symptoms. She had TVUS to no identifiable IUP. Patient reports she has a copper IUD, placed 9 years ago. Denies abdominal pain, nausea, vomiting, fever, chills, urinary symptoms. She was evaluated at the OBGYN clinic yesterday but was unclear about the plan regarding her IUD.

## 2024-08-21 NOTE — ED ADULT NURSE NOTE - NSFALLUNIVINTERV_ED_ALL_ED
Bed/Stretcher in lowest position, wheels locked, appropriate side rails in place/Call bell, personal items and telephone in reach/Instruct patient to call for assistance before getting out of bed/chair/stretcher/Non-slip footwear applied when patient is off stretcher/[place] to call system/Physically safe environment - no spills, clutter or unnecessary equipment/Purposeful proactive rounding/Room/bathroom lighting operational, light cord in reach

## 2024-08-21 NOTE — ED PROVIDER NOTE - PROGRESS NOTE DETAILS
Keri Rios,  (PGY-2): Ultrasound showing intrauterine pregnancy of unknown viability. No fetal pole or yolk sac. OB consulted, will evaluate in the ED. LI Galvin: Case endorsed. Pt reassessed, states she feels better. Pt with increasing beta hcg, pregnancy of unknown location in setting of having an IUD. Pt was seen by OB/GYN, recs appreciated. Pt is clinically stable for discharge home to return to the ED in 2 days. Strict return precautions.

## 2024-08-21 NOTE — ED PROVIDER NOTE - HIV OFFER
How Severe Is Your Skin Lesion?: mild Have Your Skin Lesions Been Treated?: not been treated Previously Declined (within the last year) Is This A New Presentation, Or A Follow-Up?: Skin Lesions

## 2024-08-21 NOTE — ED PROVIDER NOTE - ATTENDING CONTRIBUTION TO CARE
38-year-old female, no past medical history, , EGA 6 weeks 0 days by LMP approximately 7/10/2024, now presents with continued spotting today.  Patient has copper IUD in place.  Patient was seen twice over the last 4 days, and had a beta of 2162 2d ago, a contaminated UA with trace leuk esterase, blood type O+, and an ultrasound 4 days ago showing no IUP or adnexal masses consistent with a pregnancy of unknown location. No abdominal pain, nausea, vomiting.    VS: afebrile, others reassuring   Gen: Well appearing in NAD, overweight  Head: NC/AT  ENT: moist mucous membranes   Neck: trachea midline, FROM (painless) - neg Brudzinski   Resp:  No distress  Ext: no deformities  Neuro:  A&Ox3  Skin:  Warm and dry as visualized  Psych:  appropriate affect and mood    Initial ED MDM: First trimester vaginal bleeding - hemodynamically stable & well appearing (other DDx: most likely threatened , other , implantation bleeding vs less likely ectopic, torsion or molar pregnancy)  Plan: 1) LABS/UA/HCG.  2) Transvaginal US.  3) reassess.  4) OBGYN consult if needed.

## 2024-08-21 NOTE — ED PROVIDER NOTE - PATIENT PORTAL LINK FT
You can access the FollowMyHealth Patient Portal offered by [facility] by registering at the following website: [email or web address]. By joining [facility]’s FollowMyHealth portal, you will also be able to view your health information using other applications (apps) compatible with our system.

## 2024-08-21 NOTE — ED PROVIDER NOTE - NSFOLLOWUPINSTRUCTIONS_ED_ALL_ED_FT
Please return to the Emergency Department in 2 days for a follow up for your pregnancy of unknown location. In 2 days, you will need repeat blood work and transvaginal ultrasound, followed by an OB/GYN consultation.     Worsening, continued or ANY new concerning symptoms return to the Emergency Department immediately including but not limited to if:    You have chest pain or trouble breathing.  You have sharp pain in your lower abdomen that is severe and starts suddenly.  You feel lightheaded or like you are going to faint.  You have increasing abdominal or pelvic pain or heavy vaginal bleeding.  You have shoulder pain.  You have a fever.  You have questions or concerns about your condition or care.

## 2024-08-21 NOTE — CONSULT NOTE ADULT - SUBJECTIVE AND OBJECTIVE BOX
**INCOMPLETE NOTE**    BRIDGET REYES  38y  Female [id number]    HPI:        Name of GYN Physician: Rodney Peter  OBHx:   x3 (, , )  GynHx: Denies fibroids, cysts, endometriosis, STI's, Abnormal pap smears   PMH:  PSH:  Meds:  Allx:  Social History:  Denies smoking use, drug use, alcohol use.       Vital Signs Last 24 Hrs  T(C): 37.1 (21 Aug 2024 03:15), Max: 37.1 (21 Aug 2024 03:15)  T(F): 98.8 (21 Aug 2024 03:15), Max: 98.8 (21 Aug 2024 03:15)  HR: 84 (21 Aug 2024 03:15) (82 - 84)  BP: 137/94 (21 Aug 2024 03:15) (137/94 - 158/97)  BP(mean): 104 (21 Aug 2024 03:15) (104 - 104)  RR: 18 (21 Aug 2024 03:15) (18 - 18)  SpO2: 99% (21 Aug 2024 03:15) (99% - 99%)    Parameters below as of 21 Aug 2024 03:15  Patient On (Oxygen Delivery Method): room air        Physical Exam:   General: sitting comfortably in bed, NAD   HEENT: neck supple, full ROM  CV: RRR  Lungs: CTA b/l, good air flow b/l   Back: No CVA tenderness  Abd: Soft, non-tender, non-distended.  Bowel sounds present.    :  No bleeding on pad.    External labia wnl.  Bimanual exam with no cervical motion tenderness, uterus wnl, adnexa non palpable b/l.  Cervix closed vs. Cervix dilated  Speculum Exam: No active bleeding from os.  Physiologic discharge.    Ext: non-tender b/l, no edema     LABS:                              10.7   8.63  )-----------( 321      ( 21 Aug 2024 04:00 )             32.7     08-21    134<L>  |  102  |  9   ----------------------------<  89  3.8   |  20<L>  |  0.58    Ca    9.3      21 Aug 2024 04:00    TPro  6.9  /  Alb  3.6  /  TBili  <0.2  /  DBili  x   /  AST  28  /  ALT  37<H>  /  AlkPhos  87  08-21    I&O's Detail      Urinalysis Basic - ( 21 Aug 2024 04:06 )    Color: Yellow / Appearance: Clear / S.020 / pH: x  Gluc: x / Ketone: Negative mg/dL  / Bili: Negative / Urobili: 0.2 mg/dL   Blood: x / Protein: Negative mg/dL / Nitrite: Negative   Leuk Esterase: Trace / RBC: 5 /HPF / WBC 1 /HPF   Sq Epi: x / Non Sq Epi: 5 /HPF / Bacteria: Negative /HPF        RADIOLOGY & ADDITIONAL STUDIES:      INTERPRETATION:  CLINICAL INFORMATION: Pregnant.  Vaginal bleeding.    LMP: 07/10/2024    Estimated Gestational Age by LMP: 5 weeks and 6 days    COMPARISON: Pelvic ultrasound 2024    Endovaginal and transabdominal pelvic sonogram. Color and Spectral   Doppler was performed.    FINDINGS:  Uterus: A left-sided subserosal fibroid with myometrial component   measures approximately 6 x 6.6 x 6.4 cm.  There is an IUD in place.    There appears to be a small gestational sac, located posterior to the   IUD.  Mean sac diameter is 6 mm.  No yolk sac or fetal pole.    Right ovary: Not visualized.  Left ovary: Not visualized.    Fluid: None.    IMPRESSION:  Small intrauterine pregnancy of unknown viability.  No yolk sac or fetal   pole.  Correlation with serial beta hCG levels is recommended, which   short-term interval follow-up pelvic ultrasound as clinically warranted.  IUD in place.  A left-sided subserosal fibroid with myometrial component measures   approximately 6 x 6.6 x 6.4 cm.    --- End of Report ---          CHERYLE JACKSON MD; Resident Radiologist  This document has been electronically signed.  COURT WALKER MD; Attending Radiologist  This document has been electronically signed. Aug 21 2024  6:31AM BRIDGET REYES  38y  Female [id number]    HPI:  37 y/o  at 6w0d by LMP of 7/10 presenting for PUL and Paragard IUD in place. Patient was following at [facility] for vaginal spotting and positive at-home pregnancy test with [facility] trend as follows: 622 (8/15)->1432 ()->2162 (). She has had multiple TVUS with no IUP visualized and difficult visualization of adnexa. She has had Paragard IUD in place for 9 years and has had regular periods with it in place. The patient saw a GYN yesterday for removal of IUD but was told the IUD could not be removed as the strings were not visualized. This then prompted ED visit as patient would like IUD removed. She denies any abdominal pain. She had vaginal spotting that is now resolved. She denies nausea/vomiting. She denies lightheadedness or dizziness. Patient states that this is a desired pregnancy.      Name of GYN Physician: Rodney Peter  OBHx:   x3 (, , )  GynHx: Denies fibroids, cysts, endometriosis, STI's, Abnormal pap smears   PMH: denies  PSH: abdominoplasty, ex-lap for splenic and liver repair   Meds: denies  Allx: NKDA  Social History:  Denies smoking use, drug use, alcohol use.       Vital Signs Last 24 Hrs  T(C): 37.1 (21 Aug 2024 03:15), Max: 37.1 (21 Aug 2024 03:15)  T(F): 98.8 (21 Aug 2024 03:15), Max: 98.8 (21 Aug 2024 03:15)  HR: 84 (21 Aug 2024 03:15) (82 - 84)  BP: 137/94 (21 Aug 2024 03:15) (137/94 - 158/97)  BP(mean): 104 (21 Aug 2024 03:15) (104 - 104)  RR: 18 (21 Aug 2024 03:15) (18 - 18)  SpO2: 99% (21 Aug 2024 03:15) (99% - 99%)    Parameters below as of 21 Aug 2024 03:15  Patient On (Oxygen Delivery Method): room air        Physical Exam:   General: sitting comfortably in bed, NAD   HEENT: neck supple, full ROM  Lungs: nonlabored breathing  Abd: Soft, non-tender, non-distended.   :  No bleeding on pad.    External labia wnl.  Bimanual exam with no cervical motion tenderness, uterus wnl, adnexa non palpable b/l.  Cervix closed.  Speculum Exam: No active bleeding from os.  Physiologic discharge.  No IUD strings visualized.   Ext: non-tender b/l, no edema     Chaperoned by Paulina JEFFERSON  LABS:                              10.7   8.63  )-----------( 321      ( 21 Aug 2024 04:00 )             32.7         134<L>  |  102  |  9   ----------------------------<  89  3.8   |  20<L>  |  0.58    Ca    9.3      21 Aug 2024 04:00    TPro  6.9  /  Alb  3.6  /  TBili  <0.2  /  DBili  x   /  AST  28  /  ALT  37<H>  /  AlkPhos  87      I&O's Detail      bHC (8/15)->1432 ()->2162 ()->3573 ()    Urinalysis Basic - ( 21 Aug 2024 04:06 )    Color: Yellow / Appearance: Clear / S.020 / pH: x  Gluc: x / Ketone: Negative mg/dL  / Bili: Negative / Urobili: 0.2 mg/dL   Blood: x / Protein: Negative mg/dL / Nitrite: Negative   Leuk Esterase: Trace / RBC: 5 /HPF / WBC 1 /HPF   Sq Epi: x / Non Sq Epi: 5 /HPF / Bacteria: Negative /HPF        RADIOLOGY & ADDITIONAL STUDIES:      INTERPRETATION:  CLINICAL INFORMATION: Pregnant.  Vaginal bleeding.    LMP: 07/10/2024    Estimated Gestational Age by LMP: 5 weeks and 6 days    COMPARISON: Pelvic ultrasound 2024    Endovaginal and transabdominal pelvic sonogram. Color and Spectral   Doppler was performed.    FINDINGS:  Uterus: A left-sided subserosal fibroid with myometrial component   measures approximately 6 x 6.6 x 6.4 cm.  There is an IUD in place.    There appears to be a small gestational sac, located posterior to the   IUD.  Mean sac diameter is 6 mm.  No yolk sac or fetal pole.    Right ovary: Not visualized.  Left ovary: Not visualized.    Fluid: None.    IMPRESSION:  Small intrauterine pregnancy of unknown viability.  No yolk sac or fetal   pole.  Correlation with serial beta hCG levels is recommended, which   short-term interval follow-up pelvic ultrasound as clinically warranted.  IUD in place.  A left-sided subserosal fibroid with myometrial component measures   approximately 6 x 6.6 x 6.4 cm.    --- End of Report ---          CHERYLE JACKSON MD; Resident Radiologist  This document has been electronically signed.  COURT WALKER MD; Attending Radiologist  This document has been electronically signed. Aug 21 2024  6:31AM

## 2024-08-21 NOTE — ED PROVIDER NOTE - CLINICAL SUMMARY MEDICAL DECISION MAKING FREE TEXT BOX
38F  LMP 7/10/24 presenting with vaginal spotting. Evaluated at [facility] on the , , and  for the same. No identifiable IUP on transvaginal ultrasound, thus pregnancy of unknown location. Will repeat hcg, TVUS, OB consult, and reassess.

## 2024-08-26 ENCOUNTER — APPOINTMENT (OUTPATIENT)
Dept: OBGYN | Facility: CLINIC | Age: 39
End: 2024-08-26
Payer: COMMERCIAL

## 2024-08-26 VITALS — SYSTOLIC BLOOD PRESSURE: 126 MMHG | DIASTOLIC BLOOD PRESSURE: 84 MMHG | BODY MASS INDEX: 36.76 KG/M2 | WEIGHT: 201 LBS

## 2024-08-26 DIAGNOSIS — O02.0 BLIGHTED OVUM AND NONHYDATIDIFORM MOLE: ICD-10-CM

## 2024-08-26 PROCEDURE — 99213 OFFICE O/P EST LOW 20 MIN: CPT

## 2024-08-28 PROBLEM — O02.0 BLIGHTED OVUM: Status: ACTIVE | Noted: 2024-08-28

## 2024-08-28 NOTE — PLAN
[FreeTextEntry1] : rto 1 week if no heartbeat then remove iud and possible vacuum curettage or if heart beat remove iud and expected management .

## 2024-08-28 NOTE — HISTORY OF PRESENT ILLNESS
[FreeTextEntry1] : pt comes for f/u . She has no pains , has some staining . She has nausea , discussed the removal of  iud .Today a bedside ultrasound showed a sac no heart beat . She wants to repet the ultrasound in one week . Discussed possibility of blighted ovum .

## 2024-09-02 ENCOUNTER — EMERGENCY (EMERGENCY)
Facility: HOSPITAL | Age: 39
LOS: 1 days | Discharge: HOME | End: 2024-09-02
Attending: EMERGENCY MEDICINE | Admitting: EMERGENCY MEDICINE
Payer: COMMERCIAL

## 2024-09-02 VITALS
HEIGHT: 65 IN | RESPIRATION RATE: 16 BRPM | WEIGHT: 199.96 LBS | OXYGEN SATURATION: 99 % | SYSTOLIC BLOOD PRESSURE: 137 MMHG | TEMPERATURE: 98 F | DIASTOLIC BLOOD PRESSURE: 84 MMHG | HEART RATE: 68 BPM

## 2024-09-02 DIAGNOSIS — Z98.890 OTHER SPECIFIED POSTPROCEDURAL STATES: Chronic | ICD-10-CM

## 2024-09-02 LAB
ALBUMIN SERPL ELPH-MCNC: 3.8 G/DL — SIGNIFICANT CHANGE UP (ref 3.3–5)
ALP SERPL-CCNC: 86 U/L — SIGNIFICANT CHANGE UP (ref 40–120)
ALT FLD-CCNC: 27 U/L — SIGNIFICANT CHANGE UP (ref 4–33)
ANION GAP SERPL CALC-SCNC: 12 MMOL/L — SIGNIFICANT CHANGE UP (ref 7–14)
APTT BLD: 31.1 SEC — SIGNIFICANT CHANGE UP (ref 24.5–35.6)
AST SERPL-CCNC: 24 U/L — SIGNIFICANT CHANGE UP (ref 4–32)
BASOPHILS # BLD AUTO: 0.04 K/UL — SIGNIFICANT CHANGE UP (ref 0–0.2)
BASOPHILS NFR BLD AUTO: 0.3 % — SIGNIFICANT CHANGE UP (ref 0–2)
BILIRUB SERPL-MCNC: <0.2 MG/DL — SIGNIFICANT CHANGE UP (ref 0.2–1.2)
BLD GP AB SCN SERPL QL: NEGATIVE — SIGNIFICANT CHANGE UP
BUN SERPL-MCNC: 10 MG/DL — SIGNIFICANT CHANGE UP (ref 7–23)
CALCIUM SERPL-MCNC: 9.2 MG/DL — SIGNIFICANT CHANGE UP (ref 8.4–10.5)
CHLORIDE SERPL-SCNC: 104 MMOL/L — SIGNIFICANT CHANGE UP (ref 98–107)
CO2 SERPL-SCNC: 20 MMOL/L — LOW (ref 22–31)
CREAT SERPL-MCNC: 0.69 MG/DL — SIGNIFICANT CHANGE UP (ref 0.5–1.3)
EGFR: 115 ML/MIN/1.73M2 — SIGNIFICANT CHANGE UP
EOSINOPHIL # BLD AUTO: 0.14 K/UL — SIGNIFICANT CHANGE UP (ref 0–0.5)
EOSINOPHIL NFR BLD AUTO: 1.2 % — SIGNIFICANT CHANGE UP (ref 0–6)
GLUCOSE SERPL-MCNC: 91 MG/DL — SIGNIFICANT CHANGE UP (ref 70–99)
HCG SERPL-ACNC: SIGNIFICANT CHANGE UP MIU/ML
HCT VFR BLD CALC: 33.5 % — LOW (ref 34.5–45)
HGB BLD-MCNC: 11.1 G/DL — LOW (ref 11.5–15.5)
IANC: 8.03 K/UL — HIGH (ref 1.8–7.4)
IMM GRANULOCYTES NFR BLD AUTO: 0.3 % — SIGNIFICANT CHANGE UP (ref 0–0.9)
INR BLD: 1.11 RATIO — SIGNIFICANT CHANGE UP (ref 0.85–1.18)
LYMPHOCYTES # BLD AUTO: 2.62 K/UL — SIGNIFICANT CHANGE UP (ref 1–3.3)
LYMPHOCYTES # BLD AUTO: 22.4 % — SIGNIFICANT CHANGE UP (ref 13–44)
MCHC RBC-ENTMCNC: 26.9 PG — LOW (ref 27–34)
MCHC RBC-ENTMCNC: 33.1 GM/DL — SIGNIFICANT CHANGE UP (ref 32–36)
MCV RBC AUTO: 81.3 FL — SIGNIFICANT CHANGE UP (ref 80–100)
MONOCYTES # BLD AUTO: 0.82 K/UL — SIGNIFICANT CHANGE UP (ref 0–0.9)
MONOCYTES NFR BLD AUTO: 7 % — SIGNIFICANT CHANGE UP (ref 2–14)
NEUTROPHILS # BLD AUTO: 8.03 K/UL — HIGH (ref 1.8–7.4)
NEUTROPHILS NFR BLD AUTO: 68.8 % — SIGNIFICANT CHANGE UP (ref 43–77)
NRBC # BLD: 0 /100 WBCS — SIGNIFICANT CHANGE UP (ref 0–0)
NRBC # FLD: 0 K/UL — SIGNIFICANT CHANGE UP (ref 0–0)
PLATELET # BLD AUTO: 309 K/UL — SIGNIFICANT CHANGE UP (ref 150–400)
POTASSIUM SERPL-MCNC: 4 MMOL/L — SIGNIFICANT CHANGE UP (ref 3.5–5.3)
POTASSIUM SERPL-SCNC: 4 MMOL/L — SIGNIFICANT CHANGE UP (ref 3.5–5.3)
PROT SERPL-MCNC: 7.1 G/DL — SIGNIFICANT CHANGE UP (ref 6–8.3)
PROTHROM AB SERPL-ACNC: 12.5 SEC — SIGNIFICANT CHANGE UP (ref 9.5–13)
RBC # BLD: 4.12 M/UL — SIGNIFICANT CHANGE UP (ref 3.8–5.2)
RBC # FLD: 14.5 % — SIGNIFICANT CHANGE UP (ref 10.3–14.5)
RH IG SCN BLD-IMP: POSITIVE — SIGNIFICANT CHANGE UP
SODIUM SERPL-SCNC: 136 MMOL/L — SIGNIFICANT CHANGE UP (ref 135–145)
WBC # BLD: 11.69 K/UL — HIGH (ref 3.8–10.5)
WBC # FLD AUTO: 11.69 K/UL — HIGH (ref 3.8–10.5)

## 2024-09-02 PROCEDURE — 99284 EMERGENCY DEPT VISIT MOD MDM: CPT

## 2024-09-02 PROCEDURE — 76830 TRANSVAGINAL US NON-OB: CPT | Mod: 26

## 2024-09-02 NOTE — ED PROVIDER NOTE - PHYSICAL EXAMINATION
Physical Exam:  Gen: no acute distress, AOx3, nontoxic appearing, able to ambulate without assistance  Head: NCAT  HEENT: EOMI, PEERLA, normal conjunctiva, tongue midline, oral mucosa moist  Lung: CTAB, no respiratory distress, no wheezes/rhonchi/rales B/L, speaking in full sentences  CV: RRR, no murmurs, rubs or gallops  Abd: soft, NT, ND, no guarding, no rigidity, no rebound tenderness, no CVA tenderness  MSK: no visible deformities, ROM normal in UE/LE, no neck / back pain, calf tenderness  Neuro: No focal sensory or motor deficits  Skin: Warm, well perfused, no rash, no leg swelling  Pelvic: Chaperoned by SABINA Serrano, closed cervix, scant blood from cervical OS.

## 2024-09-02 NOTE — ED PROVIDER NOTE - OBJECTIVE STATEMENT
38 year old woman  LMP 7/10 presenting with 1 day of worsening vaginal bleeding and intermittent abdominal pain. Her LMP was 7/10, she has an IUD in place, was seen in the ED multiple times for vaginal bleeding, most recent US revealed intrauterine pregnancy. She follows with Dr Peter. Today her vaginal bleeding worsened a little bit since the last time she was here, but is still mild spotting. She had two episodes of sharp abdominal pain earlier today which resolved after a few minutes with associated nausea and no vomiting. Denies fevers, chills, SOB, CP, dysuria.

## 2024-09-02 NOTE — ED PROVIDER NOTE - NSFOLLOWUPINSTRUCTIONS_ED_ALL_ED_FT
You were seen in the Emergency Department for vaginal bleeding during pregnancy. Your labs did not show any signs of significant bleeding, and your transvaginal ultrasound showed a pregnancy in the uterus with a normal heartbeat. Please follow up with your OBGYN and keep your appointment for this Friday.     Return to the Emergency Department if you experience significant vaginal bleeding, severe abdominal pain, severe nausea and vomiting, and significant fevers and chills.

## 2024-09-02 NOTE — ED PROVIDER NOTE - ATTENDING CONTRIBUTION TO CARE
DR. DIALLO, ATTENDING MD-  I performed a face to face bedside interview with the patient regarding history of present illness, review of symptoms and past medical history. I completed an independent physical exam.  I have discussed the patient's plan of care with the resident.   Documentation as above in the note.    40 y/o female lmp 7/7, IUD in place, unsure how many weeks she is pregnant here with vag bld and lightheadedness.  Evaluate for iup, anemia, need for rhogam.  Obtain cbc cmp hcg coags t&s tvus reassess.

## 2024-09-02 NOTE — ED PROVIDER NOTE - HIV OFFER
Previously Declined (within the last year) Episodes of uncontrolled nausea or vomiting not relieved by anti-nausea medication

## 2024-09-02 NOTE — ED PROVIDER NOTE - CARE PROVIDER_API CALL
Aldair Peterlando  Obstetrics and Gynecology  [address] [place] [address], [address] [place] [address]  Phone: [phone]  Fax: [phone]  Established Patient  Follow Up Time: 4-6 Days

## 2024-09-02 NOTE — ED ADULT TRIAGE NOTE - SOURCE OF INFORMATION
Operative Note    Patient: Fabio Lan 68 year old male    MRN: [id number]    Surgeon(s): Jaswant Andujar DO  Phone Number: [phone]                       Surgeon(s) and Role:     * Jaswant Andujar DO - Primary    Assistant(s): Allen Resendez MD  and later in the case Jason Whitaker MD    Pre-Op Diagnosis: PRIMARY OSTEOARTHRITIS OF LEFT HIP     Post-Op Diagnosis: Same     Procedure: Procedure(s):  LEFT TOTAL HIP ARTHROPLASTY- POSTERIOR APPROACH    Anesthesia Type: General                                   Complications: None    Description:     Implants: Micki G7 62 mm with 2 screws Echo stem 15 mm with a +0 neck 40 mm ceramic head    Anesthesiologist: Dr. Green    Anesthetic type: General    Estimated blood loss: 250 mL    Specimens removed: Severe DJD arthritic left hip    Findings: Bone-on-bone contact of the left hip with severe degenerative changes and osteophyte formation      Complications: None apparent    Procedure in detail:    The patient was given operative and nonoperative treatment options. Given the fact that he  has failed outpatient conservative treatment which included but was not limited to anti-inflammatories, analgesics, behavior modification, physical therapy, injections, rest, ice, compression and elevation, surgery was recommended.  The patient has a history of many years with pain in his  hip.  This has continued to affect his  activities of daily living.  He  has pain every day.  On exam, he  has painful limited range of motion related to the affected hip.  On x-rays, he  demonstrates bone-on-bone contact with spur formation and increased subchondral sclerosis.    he  is fully aware of the potential risks which include but are not limited to infection neurovascular injury, need for further operative intervention at a later date, intraoperative fracture, problems with wound healing, problems with anesthesia including blood clots, heart attack and death, loss of function range of  motion to the affected extremity and failure the present procedure to alleviate their current symptoms.  Advance care planning discussed by hospital staff and documented in the medical record.    The patient was transported to the operative suite placed in the right lateral decubitus position with the left hip superior. He  was given a general anesthetic. When an adequate level of anesthesia was obtained all areas of potential neurovascular compromise were padded appropriately. The left hip was sterilely prepped and draped in the usual fashion. The procedure began with a posterior approach to the left hip. A 10 cm incision was made. The underlying piriformis tendon and a trapezoidal capsulotomy were tagged and reflected after the fascia hawa was incised and retractors were placed. The hip was dislocated and evaluation of both the head and the acetabular cup demonstrated severe DJD with large osteophytes peripherally about the lip of the cup and match lesions about the femoral head with bone-on-bone contact. We proceeded with total hip arthroplasty. Osteotomy of the femoral neck was performed a fingerbreadth above the lesser trochanter. Sequential reaming of the acetabulum was performed to 61 mm and a 62 mm cup was inserted and secured in place with 2 screws. The high wall liner was placed in the 2:00 position to avoid and guard against posterior dislocation. Sequential reaming and broaching of the proximal femur was performed and a trial stem was inserted. X-rays were obtained.  High offset was used to match the anatomic position of the right hip. Leg lengths were symmetrical by x-ray the trial components were removed. A 15 mm Echo stem was implanted and checked and noted to be stable in all planes. Trial reduction with the femoral head was performed and a +0 standard 40 mm head was stable throughout the range of motion greater than 90° of hip flexion hyperextension 30° abduction and internal rotation as well as  external rotation to greater than 80° without apparent dislocation. In a 45° of abduction and external rotation the hip was symmetrically reduced. In regard to the position of sleep he  was stable. There was no apparent subluxation or dislocation. The 40 mm +0 head was applied and the hip was once again reduced we made sure that there was nothing else in the acetabulum at that time.  Upon implantation of the femoral stem, there was no evidence of any fractures about the calcar.  The hip was concentrically reduced and stable. In regard to the osteophytes about the acetabular lip both anteriorly and posteriorly these were completely removed to avoid impingement. The wounds were thoroughly lavaged and deep fascia closure was performed with a #1 Stratafix suture and a #2 antibiotic Vicryl suture followed by irrigation approximation of the subcuticular tissues again with the use of a 2-0 Vicryl suture and the skin was run with a 3-0 Monocryl. The skin was cleaned and dried a Dermabond Prenio tape was applied and when this had completely dried a Silverlon dressing was applied.  The patient was transported to the recovery room with side rails up x2. he  tolerated the procedure without difficulty. Anticipate 1 day of hospitalization. Weight-bear as tolerated right lower extremity. Hip dislocation precautions at all times. DVT and PE prophylaxis with Eliquis 2.5 mg po BID, STACIE hose and plexi pulses. he will be on Ancef for 3 doses within 23 hours. Anticipate followup in 10 days to 2 weeks. He  has any questions or concerns prior to followup he  may contact our office at his  earliest convenience [phone].      Findings: Severe bone-on-bone contact left hip    Specimens Removed:   ID Type Source Tests Collected by Time   A : Bone and Tissue Left Hip Bone Hip, Left SURGICAL PATHOLOGY Jaswant Andujar, DO 2/6/2023 1334        Estimated Blood Loss: 400 mL     Assistant Tasks: Opening and closing, Removing tissue and  Implanting devices     Implants:   Implant Name Type Inv. Item Serial No.  Lot No. LRB No. Used Action   SHELL ACTB HIP 62MM G7 H HMSPHR 4 HOLE CLR CD - [id number] Cup / Shell SHELL ACTB HIP 62MM G7 H HMSPHR 4 HOLE CLR CD  [employer] [id number] Left 1 Implanted   G7 VIT E HIGH WALL LNR 40MM H - [id number] Liner G7 VIT E HIGH WALL LNR 40MM H  [employer] [id number] Left 1 Implanted   SCREW BN 6.5MM 25MM TRILOGY SELF TAP STRL HIP - [id number] Screw SCREW BN 6.5MM 25MM TRILOGY SELF TAP STRL HIP  [employer] [id number] Left 1 Implanted   SCREW BN 6.5MM 30MM TRILOGY SELF TAP STRL ACTB - [id number] Screw SCREW BN 6.5MM 30MM TRILOGY SELF TAP STRL ACTB  [employer] [id number] Left 1 Implanted   Echo Bi-Metric Hip System Lateralized Femoral Stem Full Proximal Profile Porous Plasma / Uncemented    [employer] . [id number] Left 1 Implanted   HEAD FEM 40MM HIP ACTB BIOLOX DELTA BIOLOX OPTN G7 - [id number] Head / Ball HEAD FEM 40MM HIP ACTB BIOLOX DELTA BIOLOX OPTN G7  [employer] [id number] Left 1 Implanted   SLEEVE CENTERING STD OFFSET TPR TYPE 1 TI G7 HIP - [id number] Component SLEEVE CENTERING STD OFFSET TPR TYPE 1 TI G7 HIP  [employer] [id number] Left 1 Implanted           I was present for the key portions of the procedure and was immediately available for the non-key portions           Patient

## 2024-09-02 NOTE — ED PROVIDER NOTE - PATIENT PORTAL LINK FT
You can access the FollowMyHealth Patient Portal offered by [facility] by registering at the following website: [email or web address]. By joining [employer]’s FollowMyHealth portal, you will also be able to view your health information using other applications (apps) compatible with our system.

## 2024-09-02 NOTE — ED ADULT NURSE NOTE - OBJECTIVE STATEMENT
A&Ox4. ambulatory. c/o pregnancy with IUD and vaginal bleeding. PT states this is her 4th pregnancy. NAD. pt denies SOB, chest pain, dizziness, weakness, urinary symptoms, HA, n/v/d, fevers, chills, pain. respirations are even and un labored. skin intact. 20g placed to LAC. labs drawn and sent. safety precautions maintained.

## 2024-09-02 NOTE — ED PROVIDER NOTE - CLINICAL SUMMARY MEDICAL DECISION MAKING FREE TEXT BOX
38 year old woman  LMP 7/10 presenting with 1 day of worsening vaginal bleeding and intermittent abdominal pain, pelvic exam with closed cervix and scant blood from the OS concerning for threatened . CBC, CMP, coags, T+S, TVUS, OBGYN.

## 2024-09-05 ENCOUNTER — APPOINTMENT (OUTPATIENT)
Dept: OBGYN | Facility: CLINIC | Age: 39
End: 2024-09-05
Payer: COMMERCIAL

## 2024-09-05 ENCOUNTER — ASOB RESULT (OUTPATIENT)
Age: 39
End: 2024-09-05

## 2024-09-05 VITALS
BODY MASS INDEX: 37.31 KG/M2 | WEIGHT: 204 LBS | OXYGEN SATURATION: 99 % | SYSTOLIC BLOOD PRESSURE: 132 MMHG | DIASTOLIC BLOOD PRESSURE: 83 MMHG

## 2024-09-05 PROCEDURE — 76815 OB US LIMITED FETUS(S): CPT

## 2024-09-05 PROCEDURE — 0500F INITIAL PRENATAL CARE VISIT: CPT

## 2024-09-05 PROCEDURE — 76801 OB US < 14 WKS SINGLE FETUS: CPT

## 2024-09-05 PROCEDURE — 76817 TRANSVAGINAL US OBSTETRIC: CPT

## 2024-09-05 PROCEDURE — 58301 REMOVE INTRAUTERINE DEVICE: CPT

## 2024-09-05 NOTE — PROCEDURE
[IUD Removal] : intrauterine device (IUD) removal [Time out performed] : Pre-procedure time out performed.  Patient's name, date of birth and procedure confirmed. [Consent Obtained] : Consent obtained [Pregnancy] : pregnancy [Risks] : risks [Benefits] : benefits [Alternatives] : alternatives [Patient] : patient [Speculum Placed] : speculum placed [IUD Removed - Forceps] : IUD removed - forceps [Sent to Pathology] : specimen was placed in buffered formalin and sent for pathology [Tolerated Well] : Patient tolerated the procedure well [No Complications] : no complications [Heavy Vaginal Bleeding] : for heavy vaginal bleeding [Pelvic Pain] : for pelvic pain

## 2024-09-11 ENCOUNTER — NON-APPOINTMENT (OUTPATIENT)
Age: 39
End: 2024-09-11

## 2024-09-22 ENCOUNTER — EMERGENCY (EMERGENCY)
Facility: HOSPITAL | Age: 39
LOS: 1 days | Discharge: HOME | End: 2024-09-22
Attending: EMERGENCY MEDICINE
Payer: COMMERCIAL

## 2024-09-22 DIAGNOSIS — Z98.890 OTHER SPECIFIED POSTPROCEDURAL STATES: Chronic | ICD-10-CM

## 2024-09-22 PROCEDURE — 99284 EMERGENCY DEPT VISIT MOD MDM: CPT

## 2024-09-23 VITALS
HEIGHT: 65 IN | TEMPERATURE: 98 F | RESPIRATION RATE: 18 BRPM | SYSTOLIC BLOOD PRESSURE: 135 MMHG | DIASTOLIC BLOOD PRESSURE: 86 MMHG | OXYGEN SATURATION: 100 % | WEIGHT: 201.94 LBS | HEART RATE: 80 BPM

## 2024-09-23 VITALS
DIASTOLIC BLOOD PRESSURE: 83 MMHG | RESPIRATION RATE: 18 BRPM | TEMPERATURE: 98 F | HEART RATE: 82 BPM | SYSTOLIC BLOOD PRESSURE: 130 MMHG | OXYGEN SATURATION: 99 %

## 2024-09-23 LAB
ALBUMIN SERPL ELPH-MCNC: 3.3 G/DL — LOW (ref 3.5–5)
ALP SERPL-CCNC: 87 U/L — SIGNIFICANT CHANGE UP (ref 40–120)
ALT FLD-CCNC: 33 U/L DA — SIGNIFICANT CHANGE UP (ref 10–60)
ANION GAP SERPL CALC-SCNC: 5 MMOL/L — SIGNIFICANT CHANGE UP (ref 5–17)
APPEARANCE UR: CLEAR — SIGNIFICANT CHANGE UP
AST SERPL-CCNC: 22 U/L — SIGNIFICANT CHANGE UP (ref 10–40)
BASOPHILS # BLD AUTO: 0.04 K/UL — SIGNIFICANT CHANGE UP (ref 0–0.2)
BASOPHILS NFR BLD AUTO: 0.4 % — SIGNIFICANT CHANGE UP (ref 0–2)
BILIRUB SERPL-MCNC: 0.2 MG/DL — SIGNIFICANT CHANGE UP (ref 0.2–1.2)
BILIRUB UR-MCNC: NEGATIVE — SIGNIFICANT CHANGE UP
BUN SERPL-MCNC: 8 MG/DL — SIGNIFICANT CHANGE UP (ref 7–18)
CALCIUM SERPL-MCNC: 9.3 MG/DL — SIGNIFICANT CHANGE UP (ref 8.4–10.5)
CHLORIDE SERPL-SCNC: 106 MMOL/L — SIGNIFICANT CHANGE UP (ref 96–108)
CO2 SERPL-SCNC: 24 MMOL/L — SIGNIFICANT CHANGE UP (ref 22–31)
COLOR SPEC: YELLOW — SIGNIFICANT CHANGE UP
CREAT SERPL-MCNC: 0.62 MG/DL — SIGNIFICANT CHANGE UP (ref 0.5–1.3)
DIFF PNL FLD: NEGATIVE — SIGNIFICANT CHANGE UP
EGFR: 118 ML/MIN/1.73M2 — SIGNIFICANT CHANGE UP
EOSINOPHIL # BLD AUTO: 0.16 K/UL — SIGNIFICANT CHANGE UP (ref 0–0.5)
EOSINOPHIL NFR BLD AUTO: 1.7 % — SIGNIFICANT CHANGE UP (ref 0–6)
FLUAV AG NPH QL: SIGNIFICANT CHANGE UP
FLUBV AG NPH QL: SIGNIFICANT CHANGE UP
GLUCOSE SERPL-MCNC: 96 MG/DL — SIGNIFICANT CHANGE UP (ref 70–99)
GLUCOSE UR QL: NEGATIVE MG/DL — SIGNIFICANT CHANGE UP
HCG SERPL-ACNC: HIGH MIU/ML
HCT VFR BLD CALC: 36 % — SIGNIFICANT CHANGE UP (ref 34.5–45)
HCV AB S/CO SERPL IA: 0.14 S/CO — SIGNIFICANT CHANGE UP (ref 0–0.99)
HCV AB SERPL-IMP: SIGNIFICANT CHANGE UP
HGB BLD-MCNC: 12 G/DL — SIGNIFICANT CHANGE UP (ref 11.5–15.5)
HIV 1 & 2 AB SERPL IA.RAPID: SIGNIFICANT CHANGE UP
IMM GRANULOCYTES NFR BLD AUTO: 0.3 % — SIGNIFICANT CHANGE UP (ref 0–0.9)
KETONES UR-MCNC: NEGATIVE MG/DL — SIGNIFICANT CHANGE UP
LEUKOCYTE ESTERASE UR-ACNC: NEGATIVE — SIGNIFICANT CHANGE UP
LIDOCAIN IGE QN: 67 U/L — SIGNIFICANT CHANGE UP (ref 13–75)
LYMPHOCYTES # BLD AUTO: 2.72 K/UL — SIGNIFICANT CHANGE UP (ref 1–3.3)
LYMPHOCYTES # BLD AUTO: 28.4 % — SIGNIFICANT CHANGE UP (ref 13–44)
MCHC RBC-ENTMCNC: 27.5 PG — SIGNIFICANT CHANGE UP (ref 27–34)
MCHC RBC-ENTMCNC: 33.3 GM/DL — SIGNIFICANT CHANGE UP (ref 32–36)
MCV RBC AUTO: 82.6 FL — SIGNIFICANT CHANGE UP (ref 80–100)
MONOCYTES # BLD AUTO: 0.68 K/UL — SIGNIFICANT CHANGE UP (ref 0–0.9)
MONOCYTES NFR BLD AUTO: 7.1 % — SIGNIFICANT CHANGE UP (ref 2–14)
NEUTROPHILS # BLD AUTO: 5.95 K/UL — SIGNIFICANT CHANGE UP (ref 1.8–7.4)
NEUTROPHILS NFR BLD AUTO: 62.1 % — SIGNIFICANT CHANGE UP (ref 43–77)
NITRITE UR-MCNC: NEGATIVE — SIGNIFICANT CHANGE UP
NRBC # BLD: 0 /100 WBCS — SIGNIFICANT CHANGE UP (ref 0–0)
PH UR: 6.5 — SIGNIFICANT CHANGE UP (ref 5–8)
PLATELET # BLD AUTO: 322 K/UL — SIGNIFICANT CHANGE UP (ref 150–400)
POTASSIUM SERPL-MCNC: 3.7 MMOL/L — SIGNIFICANT CHANGE UP (ref 3.5–5.3)
POTASSIUM SERPL-SCNC: 3.7 MMOL/L — SIGNIFICANT CHANGE UP (ref 3.5–5.3)
PROT SERPL-MCNC: 7.5 G/DL — SIGNIFICANT CHANGE UP (ref 6–8.3)
PROT UR-MCNC: NEGATIVE MG/DL — SIGNIFICANT CHANGE UP
RBC # BLD: 4.36 M/UL — SIGNIFICANT CHANGE UP (ref 3.8–5.2)
RBC # FLD: 15.1 % — HIGH (ref 10.3–14.5)
SARS-COV-2 RNA SPEC QL NAA+PROBE: SIGNIFICANT CHANGE UP
SODIUM SERPL-SCNC: 135 MMOL/L — SIGNIFICANT CHANGE UP (ref 135–145)
SP GR SPEC: 1.01 — SIGNIFICANT CHANGE UP (ref 1–1.03)
UROBILINOGEN FLD QL: 0.2 MG/DL — SIGNIFICANT CHANGE UP (ref 0.2–1)
WBC # BLD: 9.58 K/UL — SIGNIFICANT CHANGE UP (ref 3.8–10.5)
WBC # FLD AUTO: 9.58 K/UL — SIGNIFICANT CHANGE UP (ref 3.8–10.5)

## 2024-09-23 PROCEDURE — 36415 COLL VENOUS BLD VENIPUNCTURE: CPT

## 2024-09-23 PROCEDURE — 99283 EMERGENCY DEPT VISIT LOW MDM: CPT | Mod: 25

## 2024-09-23 PROCEDURE — 83690 ASSAY OF LIPASE: CPT

## 2024-09-23 PROCEDURE — 86900 BLOOD TYPING SEROLOGIC ABO: CPT

## 2024-09-23 PROCEDURE — 86901 BLOOD TYPING SEROLOGIC RH(D): CPT

## 2024-09-23 PROCEDURE — 86803 HEPATITIS C AB TEST: CPT

## 2024-09-23 PROCEDURE — 84702 CHORIONIC GONADOTROPIN TEST: CPT

## 2024-09-23 PROCEDURE — 87636 SARSCOV2 & INF A&B AMP PRB: CPT

## 2024-09-23 PROCEDURE — 86850 RBC ANTIBODY SCREEN: CPT

## 2024-09-23 PROCEDURE — 85025 COMPLETE CBC W/AUTO DIFF WBC: CPT

## 2024-09-23 PROCEDURE — 80053 COMPREHEN METABOLIC PANEL: CPT

## 2024-09-23 PROCEDURE — 81003 URINALYSIS AUTO W/O SCOPE: CPT

## 2024-09-23 PROCEDURE — 86703 HIV-1/HIV-2 1 RESULT ANTBDY: CPT

## 2024-09-23 RX ORDER — ACETAMINOPHEN 325 MG/1
2 TABLET ORAL
Qty: 20 | Refills: 0
Start: 2024-09-23

## 2024-09-23 RX ORDER — SODIUM CHLORIDE 9 MG/ML
1000 INJECTION INTRAMUSCULAR; INTRAVENOUS; SUBCUTANEOUS ONCE
Refills: 0 | Status: COMPLETED | OUTPATIENT
Start: 2024-09-23 | End: 2024-09-23

## 2024-09-23 RX ORDER — ACETAMINOPHEN 325 MG/1
650 TABLET ORAL ONCE
Refills: 0 | Status: COMPLETED | OUTPATIENT
Start: 2024-09-23 | End: 2024-09-23

## 2024-09-23 RX ADMIN — ACETAMINOPHEN 650 MILLIGRAM(S): 325 TABLET ORAL at 05:34

## 2024-09-23 RX ADMIN — SODIUM CHLORIDE 1000 MILLILITER(S): 9 INJECTION INTRAMUSCULAR; INTRAVENOUS; SUBCUTANEOUS at 02:51

## 2024-09-23 RX ADMIN — ACETAMINOPHEN 650 MILLIGRAM(S): 325 TABLET ORAL at 03:17

## 2024-09-23 NOTE — ED PROVIDER NOTE - PATIENT PORTAL LINK FT
You can access the FollowMyHealth Patient Portal offered by [facility] by registering at the following website: [email or web address]. By joining [employer]’s FollowMyHealth portal, you will also be able to view your health information using other applications (apps) compatible with our system.

## 2024-09-23 NOTE — ED PROVIDER NOTE - OBJECTIVE STATEMENT
38-year-old female LMP 2024.  G4P-0-0-3.  Patient with chief complaint of low back discomfort, no dysuria, no urinary or bowel incontinence or retention, no visible hematuria.  Patient denies any vaginal bleeding.  On my evaluation patient denies any abdominal pain, patient has no guarding to full palpation of abdomen.  Patient endorses nonproductive coughing and throat discomfort for 2 days.  No reported fever, no vomiting.    Patient states her OB/GYN attending is Dr. Peter and had US on 2024  demonstrating single live intrauterine pregnancy.

## 2024-09-23 NOTE — ED PROVIDER NOTE - NSFOLLOWUPINSTRUCTIONS_ED_ALL_ED_FT
Return if you have pain, if you have vaginal bleeding or if vaginal bleeding returns, is continuous or worsens, if you feel weak, have vomiting, any concerns. Take medications as instructed if they were prescribed. See your GYN as soon as possible (1-2 days). If you need assistance with follow up appointment, you can contact our Care Coordinator at [phone].  In addition the [facility] is located at [address] [place] [address], tel [phone].     Viruses are tiny germs that can get into a person's body and cause illness. There are many different types of viruses. And they cause many types of illness. Viral illnesses can range from mild to severe. They can affect various parts of the body.    Short-term conditions that are caused by a virus include colds and flu (influenza) and stomach viruses. Long-term conditions that are caused by a virus include herpes, shingles, and human immunodeficiency virus (HIV) infection. A few viruses have been linked to certain cancers.    What are the causes?  Many types of viruses can cause illness. Viruses get into cells in your body, multiply, and cause the infected cells to work differently or die. When these cells die, they release more of the virus. When this happens, you get symptoms of the illness and the virus spreads to other cells. If the virus takes over how the cell works, it can cause the cell to divide and grow out of control. This happens when a virus causes cancer.    Different viruses get into the body in different ways. You can get a virus by:  Swallowing food or water that has come in contact with the virus.  Breathing in droplets that have been coughed or sneezed into the air by an infected person.  Touching a surface that has the virus on it and then touching your eyes, nose, or mouth.  Being bitten by an insect or animal that carries the virus.  Having sexual contact with a person who is infected with the virus.  Being exposed to blood or fluids that contain the virus, either through an open cut or during a transfusion.  If a virus enters your body, your body's disease-fighting system (immune system) will try to fight the virus. You may be at higher risk for a viral illness if your immune system is weak.    What are the signs or symptoms?  Symptoms depend on the type of virus and the location of the cells that it gets into. Symptoms can include:  For cold and flu viruses:  Fever.  Headache.  Sore throat.  Muscle aches.  Stuffy nose (nasal congestion).  Cough.  For stomach (gastrointestinal) viruses:  Fever.  Pain in the abdomen.  Nausea or vomiting.  Diarrhea.  For liver viruses (hepatitis):  Loss of appetite.  Feeling tired.  Skin or the white parts of your eyes turning yellow (jaundice).  For brain and spinal cord viruses:  Fever.  Headache.  Stiff neck.  Nausea and vomiting.  Confusion or being sleepy.  For skin viruses:  Warts.  Itching.  Rash.  For sexually transmitted viruses:  Discharge.  Swelling.  Redness.  Rash.  How is this diagnosed?  This condition may be diagnosed based on one or more of these:  Your symptoms and medical history.  A physical exam.  Tests, such as:  Blood tests.  Tests on a sample of mucus from your lungs (sputum sample).  Tests on a poop (stool) sample.  Tests on a swab of body fluids or a skin sore (lesion).  How is this treated?  Viruses can be hard to treat because they live within cells. Antibiotics do not treat viruses because these medicines do not get inside cells. Treatment for a viral illness may include:  Resting and drinking a lot of fluids.  Medicines to treat symptoms. These can include over-the-counter medicine for pain and fever, medicines for cough or congestion, and medicines for diarrhea.  Antiviral medicines. These medicines are available only for certain types of viruses.  Some viral illnesses can be prevented with vaccinations. A common example is the flu shot.    Follow these instructions at home:  Medicines    Take over-the-counter and prescription medicines only as told by your health care provider.  If you were prescribed an antiviral medicine, take it as told by your provider. Do not stop taking the antiviral even if you start to feel better.  Know when antibiotics are needed and when they are not needed. Antibiotics do not treat viruses. You may get an antibiotic if your provider thinks that you may have, or are at risk for, a bacterial infection and you have a viral infection.  Do not ask for an antibiotic prescription if you have been diagnosed with a viral illness. Antibiotics will not make your illness go away faster.  Taking antibiotics when they are not needed can lead to antibiotic resistance. When this develops, the medicine no longer works against the bacteria that it normally fights.  General instructions    Drink enough fluids to keep your pee (urine) pale yellow.  Rest as much as possible.  Return to your normal activities as told by your provider. Ask your provider what activities are safe for you.  How is this prevented?  A person washing hands with soap and water.  To lower your risk of getting another viral illness:  Wash your hands often with soap and water for at least 20 seconds. If soap and water are not available, use hand .  Avoid touching your nose, eyes, and mouth, especially if you have not washed your hands recently.  If anyone in your household has a viral infection, clean all household surfaces that may have been in contact with the virus. Use soap and hot water. You may also use a commercially prepared, bleach-containing solution.  Stay away from people who are sick with symptoms of a viral infection.  Do not share items such as toothbrushes and water bottles with other people.  Keep your vaccinations up to date. This includes getting a yearly flu shot.  Eat a healthy diet and get plenty of rest.  Contact a health care provider if:  You have symptoms of a viral illness that do not go away.  Your symptoms come back after going away.  Your symptoms get worse.  Get help right away if:  You have trouble breathing.  You have a severe headache or a stiff neck.  You have severe vomiting or pain in your abdomen.  These symptoms may be an emergency. Get help right away. Call 911.  Do not wait to see if the symptoms will go away.  Do not drive yourself to the hospital.  This information is not intended to replace advice given to you by your health care provider. Make sure you discuss any questions you have with your health care provider.

## 2024-09-23 NOTE — ED PROVIDER NOTE - CLINICAL SUMMARY MEDICAL DECISION MAKING FREE TEXT BOX
510am- Pt feels better, symptoms most likely due to viral syndrome/myalgia.  Pt has no abdominal pain or vaginal bleeding.  Pt is well appearing, has no new complaints and able to walk with normal gait. Pt is stable for discharge and follow up with . (OBGYN service was informed) Pt educated on care and need for follow up. Discussed anticipatory guidance and return precautions. Questions answered. I had a detailed discussion with the patient regarding the historical points, exam findings, and any diagnostic results supporting the discharge diagnosis.

## 2024-09-23 NOTE — ED PROVIDER NOTE - CARE PROVIDER_API CALL
Rodney Peter  Obstetrics and Gynecology  [address] [place] [address], [address] [place] [address]  Phone: [phone]  Fax: [phone]  Follow Up Time:

## 2024-09-26 ENCOUNTER — NON-APPOINTMENT (OUTPATIENT)
Age: 39
End: 2024-09-26

## 2024-09-26 ENCOUNTER — LABORATORY RESULT (OUTPATIENT)
Age: 39
End: 2024-09-26

## 2024-10-28 NOTE — ED ADULT TRIAGE NOTE - NS ED NOTE AC HIGH RISK COUNTRIES
Continue Mounjaro 7.5 mg weekly  Continue  Amaryl 4 mg daily   Stop Metformin 1000 mg  Decrease Lantus 20 units    Call in 2 weeks to have CGM reviewed  
No
